# Patient Record
Sex: FEMALE | Race: WHITE | NOT HISPANIC OR LATINO | Employment: OTHER | URBAN - METROPOLITAN AREA
[De-identification: names, ages, dates, MRNs, and addresses within clinical notes are randomized per-mention and may not be internally consistent; named-entity substitution may affect disease eponyms.]

---

## 2017-11-09 ENCOUNTER — APPOINTMENT (OUTPATIENT)
Dept: WOMENS IMAGING | Facility: HOSPITAL | Age: 42
End: 2017-11-09

## 2017-11-09 PROCEDURE — 77063 BREAST TOMOSYNTHESIS BI: CPT | Performed by: RADIOLOGY

## 2017-11-09 PROCEDURE — 77067 SCR MAMMO BI INCL CAD: CPT | Performed by: RADIOLOGY

## 2018-11-16 ENCOUNTER — APPOINTMENT (OUTPATIENT)
Dept: WOMENS IMAGING | Facility: HOSPITAL | Age: 43
End: 2018-11-16

## 2018-11-16 PROCEDURE — 77063 BREAST TOMOSYNTHESIS BI: CPT | Performed by: RADIOLOGY

## 2018-11-16 PROCEDURE — 77067 SCR MAMMO BI INCL CAD: CPT | Performed by: RADIOLOGY

## 2019-12-10 ENCOUNTER — APPOINTMENT (OUTPATIENT)
Dept: WOMENS IMAGING | Facility: HOSPITAL | Age: 44
End: 2019-12-10

## 2019-12-10 PROCEDURE — 77067 SCR MAMMO BI INCL CAD: CPT | Performed by: RADIOLOGY

## 2019-12-10 PROCEDURE — 77063 BREAST TOMOSYNTHESIS BI: CPT | Performed by: RADIOLOGY

## 2020-12-16 ENCOUNTER — APPOINTMENT (OUTPATIENT)
Dept: WOMENS IMAGING | Facility: HOSPITAL | Age: 45
End: 2020-12-16

## 2020-12-16 PROCEDURE — 77063 BREAST TOMOSYNTHESIS BI: CPT | Performed by: RADIOLOGY

## 2020-12-16 PROCEDURE — 77067 SCR MAMMO BI INCL CAD: CPT | Performed by: RADIOLOGY

## 2021-01-04 ENCOUNTER — TELEPHONE (OUTPATIENT)
Dept: INTERNAL MEDICINE | Age: 46
End: 2021-01-04

## 2021-01-04 NOTE — TELEPHONE ENCOUNTER
LVM FOR PATIENT TO CALL.  PER DR PETERS SHE CAN BE ADDED AS A NEW PT IN THE NEXT 2 AVAILABLE SLOTS.

## 2021-01-14 ENCOUNTER — OFFICE VISIT (OUTPATIENT)
Dept: INTERNAL MEDICINE | Age: 46
End: 2021-01-14

## 2021-01-14 ENCOUNTER — LAB (OUTPATIENT)
Dept: LAB | Facility: HOSPITAL | Age: 46
End: 2021-01-14

## 2021-01-14 VITALS
WEIGHT: 135 LBS | DIASTOLIC BLOOD PRESSURE: 54 MMHG | TEMPERATURE: 97.8 F | HEART RATE: 60 BPM | BODY MASS INDEX: 19.33 KG/M2 | HEIGHT: 70 IN | OXYGEN SATURATION: 97 % | SYSTOLIC BLOOD PRESSURE: 104 MMHG

## 2021-01-14 DIAGNOSIS — R06.02 SHORTNESS OF BREATH: ICD-10-CM

## 2021-01-14 DIAGNOSIS — F41.9 ANXIETY: ICD-10-CM

## 2021-01-14 DIAGNOSIS — R07.9 CHEST PAIN, UNSPECIFIED TYPE: ICD-10-CM

## 2021-01-14 DIAGNOSIS — F51.02 ADJUSTMENT INSOMNIA: ICD-10-CM

## 2021-01-14 DIAGNOSIS — U07.1 COVID-19 VIRUS INFECTION: Primary | ICD-10-CM

## 2021-01-14 LAB
ALBUMIN SERPL-MCNC: 4.1 G/DL (ref 3.5–5.2)
ALBUMIN/GLOB SERPL: 1.6 G/DL
ALP SERPL-CCNC: 17 U/L (ref 39–117)
ALT SERPL W P-5'-P-CCNC: 15 U/L (ref 1–33)
ANION GAP SERPL CALCULATED.3IONS-SCNC: 10.2 MMOL/L (ref 5–15)
AST SERPL-CCNC: 14 U/L (ref 1–32)
BASOPHILS # BLD AUTO: 0.03 10*3/MM3 (ref 0–0.2)
BASOPHILS NFR BLD AUTO: 0.3 % (ref 0–1.5)
BILIRUB SERPL-MCNC: 0.3 MG/DL (ref 0–1.2)
BUN SERPL-MCNC: 14 MG/DL (ref 6–20)
BUN/CREAT SERPL: 18.7 (ref 7–25)
CALCIUM SPEC-SCNC: 9.1 MG/DL (ref 8.6–10.5)
CHLORIDE SERPL-SCNC: 98 MMOL/L (ref 98–107)
CO2 SERPL-SCNC: 31.8 MMOL/L (ref 22–29)
CREAT SERPL-MCNC: 0.75 MG/DL (ref 0.57–1)
D DIMER PPP FEU-MCNC: 0.33 MCGFEU/ML (ref 0–0.49)
DEPRECATED RDW RBC AUTO: 38.2 FL (ref 37–54)
EOSINOPHIL # BLD AUTO: 0.06 10*3/MM3 (ref 0–0.4)
EOSINOPHIL NFR BLD AUTO: 0.7 % (ref 0.3–6.2)
ERYTHROCYTE [DISTWIDTH] IN BLOOD BY AUTOMATED COUNT: 11.1 % (ref 12.3–15.4)
GFR SERPL CREATININE-BSD FRML MDRD: 84 ML/MIN/1.73
GLOBULIN UR ELPH-MCNC: 2.6 GM/DL
GLUCOSE SERPL-MCNC: 97 MG/DL (ref 65–99)
HCT VFR BLD AUTO: 38.9 % (ref 34–46.6)
HGB BLD-MCNC: 13.2 G/DL (ref 12–15.9)
IMM GRANULOCYTES # BLD AUTO: 0.02 10*3/MM3 (ref 0–0.05)
IMM GRANULOCYTES NFR BLD AUTO: 0.2 % (ref 0–0.5)
LYMPHOCYTES # BLD AUTO: 4.33 10*3/MM3 (ref 0.7–3.1)
LYMPHOCYTES NFR BLD AUTO: 48.1 % (ref 19.6–45.3)
MCH RBC QN AUTO: 32.2 PG (ref 26.6–33)
MCHC RBC AUTO-ENTMCNC: 33.9 G/DL (ref 31.5–35.7)
MCV RBC AUTO: 94.9 FL (ref 79–97)
MONOCYTES # BLD AUTO: 0.92 10*3/MM3 (ref 0.1–0.9)
MONOCYTES NFR BLD AUTO: 10.2 % (ref 5–12)
NEUTROPHILS NFR BLD AUTO: 3.64 10*3/MM3 (ref 1.7–7)
NEUTROPHILS NFR BLD AUTO: 40.5 % (ref 42.7–76)
NRBC BLD AUTO-RTO: 0 /100 WBC (ref 0–0.2)
PLATELET # BLD AUTO: 299 10*3/MM3 (ref 140–450)
PMV BLD AUTO: 10.6 FL (ref 6–12)
POTASSIUM SERPL-SCNC: 3.2 MMOL/L (ref 3.5–5.2)
PROT SERPL-MCNC: 6.7 G/DL (ref 6–8.5)
RBC # BLD AUTO: 4.1 10*6/MM3 (ref 3.77–5.28)
SODIUM SERPL-SCNC: 140 MMOL/L (ref 136–145)
T4 FREE SERPL-MCNC: 1.15 NG/DL (ref 0.93–1.7)
TSH SERPL DL<=0.05 MIU/L-ACNC: 1.48 UIU/ML (ref 0.27–4.2)
WBC # BLD AUTO: 9 10*3/MM3 (ref 3.4–10.8)

## 2021-01-14 PROCEDURE — 84439 ASSAY OF FREE THYROXINE: CPT | Performed by: INTERNAL MEDICINE

## 2021-01-14 PROCEDURE — 80053 COMPREHEN METABOLIC PANEL: CPT | Performed by: INTERNAL MEDICINE

## 2021-01-14 PROCEDURE — 36415 COLL VENOUS BLD VENIPUNCTURE: CPT | Performed by: INTERNAL MEDICINE

## 2021-01-14 PROCEDURE — 85379 FIBRIN DEGRADATION QUANT: CPT | Performed by: INTERNAL MEDICINE

## 2021-01-14 PROCEDURE — 99205 OFFICE O/P NEW HI 60 MIN: CPT | Performed by: INTERNAL MEDICINE

## 2021-01-14 PROCEDURE — 85025 COMPLETE CBC W/AUTO DIFF WBC: CPT | Performed by: INTERNAL MEDICINE

## 2021-01-14 PROCEDURE — 84443 ASSAY THYROID STIM HORMONE: CPT | Performed by: INTERNAL MEDICINE

## 2021-01-14 RX ORDER — ALBUTEROL SULFATE 90 UG/1
2 AEROSOL, METERED RESPIRATORY (INHALATION) EVERY 6 HOURS PRN
COMMUNITY
Start: 2020-12-26 | End: 2021-01-26 | Stop reason: SDUPTHER

## 2021-01-14 RX ORDER — CALCIUM CARBONATE 200(500)MG
1 TABLET,CHEWABLE ORAL DAILY
Status: ON HOLD | COMMUNITY
End: 2021-10-29

## 2021-01-14 RX ORDER — DIPHENOXYLATE HYDROCHLORIDE AND ATROPINE SULFATE 2.5; .025 MG/1; MG/1
TABLET ORAL
Status: ON HOLD | COMMUNITY
End: 2021-10-29

## 2021-01-14 RX ORDER — TRAZODONE HYDROCHLORIDE 50 MG/1
50 TABLET ORAL NIGHTLY PRN
Qty: 30 TABLET | Refills: 0 | Status: SHIPPED | OUTPATIENT
Start: 2021-01-14 | End: 2021-02-10

## 2021-01-14 RX ORDER — MONTELUKAST SODIUM 10 MG/1
10 TABLET ORAL
COMMUNITY
Start: 2021-01-05

## 2021-01-14 RX ORDER — PANTOPRAZOLE SODIUM 40 MG/1
40 TABLET, DELAYED RELEASE ORAL DAILY
Qty: 30 TABLET | Refills: 0 | Status: SHIPPED | OUTPATIENT
Start: 2021-01-14 | End: 2021-01-15

## 2021-01-14 RX ORDER — NORGESTIMATE AND ETHINYL ESTRADIOL 7DAYSX3 28
1 KIT ORAL DAILY
COMMUNITY
Start: 2020-12-05

## 2021-01-14 NOTE — PROGRESS NOTES
Call patient with her test result(s) and mail the results to her if MyChart is NOT active.    Potassium level is low: start KCL 20 meq once daily x 3 days then discontinue #3, no refills.   Thyroid is normal.   Complete blood count is stable.   Await D-dimer result.

## 2021-01-14 NOTE — PROGRESS NOTES
Call patient with her test result(s) and mail the results to her if MyChart is NOT active.  Refer to separate lab result note regarding low potassium.     D-dimer is normal. DVT or pulmonary embolism unlikely.

## 2021-01-14 NOTE — PROGRESS NOTES
I N T E R N A L  M E D I C I N E  J U N O H  K I M,  M D      ENCOUNTER DATE:  01/14/2021    Sonali Lantigua / 45 y.o. / female      CHIEF COMPLAINT / REASON FOR OFFICE VISIT     Establish Care, Shortness of Breath, and Recent COVID infection      ASSESSMENT & PLAN     Problem List Items Addressed This Visit     None      Visit Diagnoses     COVID-19 virus infection    -  Primary    Relevant Orders    CBC & Differential    D-dimer, Quantitative    CBC Auto Differential    Shortness of breath        Chest pain, unspecified type        Relevant Medications    pantoprazole (PROTONIX) 40 MG EC tablet    Anxiety        Relevant Orders    TSH+Free T4    Comprehensive Metabolic Panel    Adjustment insomnia        Relevant Medications    traZODone (DESYREL) 50 MG tablet        Orders Placed This Encounter   Procedures   • TSH+Free T4   • Comprehensive Metabolic Panel   • D-dimer, Quantitative   • CBC Auto Differential   • CBC & Differential     New Medications Ordered This Visit   Medications   • traZODone (DESYREL) 50 MG tablet     Sig: Take 1 tablet by mouth At Night As Needed for Sleep.     Dispense:  30 tablet     Refill:  0   • pantoprazole (PROTONIX) 40 MG EC tablet     Sig: Take 1 tablet by mouth Daily.     Dispense:  30 tablet     Refill:  0       SUMMARY/DISCUSSION  • Post-COVID-19 symptoms of persitent shortness of breath, anxiety, insomnia.   • Check D-dimer, complete blood count, CMP, TSH and Free T4   • Trazodone 50 mg qHS PRN for sleep   • Pantoprazole 40 mg qd for possible GERD symptoms   • Advil or Tylenol PRN for pain  • Consider repeat chest xray and ordering echocardiogram if ongoing shortness of breath.   • Having some emotional difficulties related to COVID-19 infection, reassurance.   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      VITAL SIGNS     Visit Vitals  /54   Pulse 60   Temp 97.8 °F (36.6 °C)   Ht 177.8 cm (70\")   Wt 61.2 kg (135 lb)   LMP 12/29/2020 (Approximate)   SpO2 97%   BMI 19.37 kg/m²       BP Readings from Last 3 Encounters:   01/14/21 104/54     Wt Readings from Last 3 Encounters:   01/14/21 61.2 kg (135 lb)     Body mass index is 19.37 kg/m².        HISTORY OF PRESENT ILLNESS     Pleasant 46 yo female here to establish care. Diagnosed with COVID-19 infection around 12/20/20. Went to [facility] and chest xray was neg for pneumonia. Since then she complains of persistent low grade dyspnea and vague anterior chest discomfort. She also complains of some right calf discomfort with swelling/redness. She is on OCP, denies history of blood clots. She complains of ongoing pain around her lower rib areas bilaterally. She saw an allergist who prescribed Z-Gama, prednisone, and Breo/albuterol for shortness of breath. She complains of increased anxiety and insomnia (no prior history). Denies depression history. She works as a  on a private jet. She has taken time off work due to COVID-19 infection.         REVIEW OF SYSTEMS     Denies fever or chills  Denies anosmia and ageusia   Denies cough, has chest pain with deep inspiration  Denies angina, palpitations or WEST   Denies edema of legs  GI neg   neg  Neuro neg  Psych neg for suicidal ideation   Skin neg       PHYSICAL EXAMINATION     Physical Exam  Constitutional:       Comments: Thin female in no acute distress    HENT:      Right Ear: Tympanic membrane normal.      Left Ear: Tympanic membrane normal.   Eyes:      Pupils: Pupils are equal, round, and reactive to light.   Cardiovascular:      Rate and Rhythm: Normal rate and regular rhythm.      Pulses: Normal pulses.      Heart sounds: No murmur. No friction rub.   Pulmonary:      Effort: Pulmonary effort is normal. No respiratory distress.      Breath sounds: Normal breath sounds. No " stridor. No wheezing, rhonchi or rales.   Abdominal:      General: Abdomen is flat. There is no distension.      Palpations: Abdomen is soft.      Tenderness: There is no abdominal tenderness.      Comments: Mild tenderness over the lower ribs bilaterally    Musculoskeletal:      Right lower leg: No edema.      Left lower leg: No edema.      Comments: No calf swelling, redness or tenderness to palpation    Lymphadenopathy:      Cervical: No cervical adenopathy.   Skin:     General: Skin is warm.      Coloration: Skin is not jaundiced.   Neurological:      General: No focal deficit present.      Mental Status: She is oriented to person, place, and time.   Psychiatric:         Attention and Perception: Attention normal.         Mood and Affect: Mood is anxious. Depressed: somewhat emotional. Affect is tearful (mild).         Speech: Speech normal.         Behavior: Behavior normal. Behavior is cooperative.         Thought Content: Thought content normal.         Cognition and Memory: Cognition and memory normal.         Judgment: Judgment normal.             REVIEWED DATA     Labs:           Imaging:     EXAMINATION(S): XR CHEST 2VW    DATE: 12/22/2020    HISTORY: SOA, covid+.     COMPARISON: Chest x-ray June 9, 2018    FINDING(S): 2 views of chest submitted. Cardiac size upper limits of normal. Mediastinal contour is normal.    No evidence of pneumothorax or pleural effusion. The lungs are hyperexpanded.    There is dextroscoliosis of the thoracic spine with straightening of the neutral thoracic alignment.    IMPRESSION:     Hyperexpanded lungs. No acute consolidation.      Medical Tests:     DATE OF EXAM: 07/12/2018  11:31  EXAMINATION(S): ECHO DOPPLER 2D MMODE SPECT COLOR COMPLETE     Trace-to-mild tricuspid regurgitation.   Trace mitral regurgitation is present.   The ejection fraction biplane was calculated at 61%.   Global left ventricular wall motion and contractility are within normal   limits.   Normal  diastolic filling pattern.        Independent assessment of PFT results from allergist showing FEV1 68%, FEV1/FVC > 0.8 (no evidence of COPD), possible asthma     Summary of old records / correspondence / consultant report:           Request outside records:           MEDICATIONS AT THE TIME OF OFFICE VISIT     Current Outpatient Medications   Medication Sig Dispense Refill   • albuterol sulfate  (90 Base) MCG/ACT inhaler Inhale 2 puffs Every 6 (Six) Hours As Needed.     • calcium carbonate (TUMS) 500 MG chewable tablet Chew 1 tablet Daily.     • Fluticasone Furoate-Vilanterol (Breo Ellipta) 100-25 MCG/INH inhaler Inhale 1 puff Daily.     • montelukast (SINGULAIR) 10 MG tablet Take 10 mg by mouth every night at bedtime.     • multivitamin (THERAGRAN) tablet tablet Take  by mouth.     • Probiotic Product (PROBIOTIC DAILY PO) Take  by mouth Daily.     • Tri-Sprintec 0.18/0.215/0.25 MG-35 MCG per tablet Take 1 tablet by mouth Daily.     • Zinc Sulfate (ZINC 15 PO) Take  by mouth. Take 50 mg daily             *Examiner was wearing KN95 mask, face shield and exam gloves during the entire duration of the visit. Patient was masked the entire time.   Minimum social distance of 6 ft maintained entire visit except if physical contact was necessary as documented.     **Dragon Disclaimer:   Much of this encounter note is an electronic transcription/translation of spoken language to printed text. The electronic translation of spoken language may permit erroneous, or at times, nonsensical words or phrases to be inadvertently transcribed. Although I have reviewed the note for such errors, some may still exist.     Template created by Josue Hahn MD

## 2021-01-15 DIAGNOSIS — E87.6 LOW BLOOD POTASSIUM: Primary | ICD-10-CM

## 2021-01-15 RX ORDER — POTASSIUM CHLORIDE 1.5 G/1.77G
20 POWDER, FOR SOLUTION ORAL DAILY
Qty: 3 EACH | Refills: 0 | Status: SHIPPED | OUTPATIENT
Start: 2021-01-15 | End: 2021-01-18

## 2021-01-15 RX ORDER — PANTOPRAZOLE SODIUM 40 MG/1
40 TABLET, DELAYED RELEASE ORAL DAILY
Qty: 30 TABLET | Refills: 2 | Status: ON HOLD | OUTPATIENT
Start: 2021-01-15 | End: 2021-10-29

## 2021-01-16 DIAGNOSIS — E87.6 LOW BLOOD POTASSIUM: ICD-10-CM

## 2021-01-18 RX ORDER — POTASSIUM CHLORIDE 1.5 G/1
POWDER, FOR SOLUTION ORAL
Qty: 3 EACH | Refills: 0 | OUTPATIENT
Start: 2021-01-18

## 2021-01-21 ENCOUNTER — TELEPHONE (OUTPATIENT)
Dept: INTERNAL MEDICINE | Age: 46
End: 2021-01-21

## 2021-01-21 NOTE — TELEPHONE ENCOUNTER
Pt had scheduled ethan 1/29/21 for pressure on throat and sob    Called pt back to find out more information     Pt saw dr mcclellan 1/14/21 to establish care and history of covid , SOB.     She still has the breathing problem, but not bad(it happen while breathing in ,but it ease later)   pressure in throat specially around afternoon.     Negative for:   tightness, chest pain or tingling, dizziness .    Offered to be worked in with APRN since dr mcclellan out for thursday and Friday. Stated she is good     Pt has ethan to see cardiology Monday    I moved her ethan from the 1/29  To 1/26.    Informed if symptoms worse until ethan date need to be evaluated in ER

## 2021-01-26 ENCOUNTER — OFFICE VISIT (OUTPATIENT)
Dept: INTERNAL MEDICINE | Age: 46
End: 2021-01-26

## 2021-01-26 ENCOUNTER — HOSPITAL ENCOUNTER (OUTPATIENT)
Dept: GENERAL RADIOLOGY | Facility: HOSPITAL | Age: 46
Discharge: HOME | End: 2021-01-26
Admitting: INTERNAL MEDICINE

## 2021-01-26 VITALS
BODY MASS INDEX: 19.47 KG/M2 | HEIGHT: 70 IN | WEIGHT: 136 LBS | TEMPERATURE: 97.1 F | OXYGEN SATURATION: 100 % | HEART RATE: 69 BPM | SYSTOLIC BLOOD PRESSURE: 100 MMHG | DIASTOLIC BLOOD PRESSURE: 60 MMHG

## 2021-01-26 DIAGNOSIS — R06.02 SHORTNESS OF BREATH: ICD-10-CM

## 2021-01-26 DIAGNOSIS — R06.02 SHORTNESS OF BREATH: Primary | ICD-10-CM

## 2021-01-26 DIAGNOSIS — F51.02 ADJUSTMENT INSOMNIA: ICD-10-CM

## 2021-01-26 DIAGNOSIS — U07.1 COVID-19 VIRUS INFECTION: ICD-10-CM

## 2021-01-26 DIAGNOSIS — R07.81 RIB PAIN ON LEFT SIDE: ICD-10-CM

## 2021-01-26 PROCEDURE — 99214 OFFICE O/P EST MOD 30 MIN: CPT | Performed by: INTERNAL MEDICINE

## 2021-01-26 PROCEDURE — 71046 X-RAY EXAM CHEST 2 VIEWS: CPT

## 2021-01-26 RX ORDER — MULTIVIT WITH MINERALS/LUTEIN
TABLET ORAL
COMMUNITY
End: 2022-08-11

## 2021-01-26 RX ORDER — CHOLECALCIFEROL (VITAMIN D3) 125 MCG
CAPSULE ORAL
COMMUNITY

## 2021-01-26 RX ORDER — ALBUTEROL SULFATE 90 UG/1
2 AEROSOL, METERED RESPIRATORY (INHALATION) EVERY 6 HOURS PRN
Qty: 8 G | Refills: 1 | Status: SHIPPED | OUTPATIENT
Start: 2021-01-26 | End: 2021-03-31

## 2021-01-26 NOTE — PROGRESS NOTES
"    I N T E R N A L  M E D I C I N E  J U N O H  K I M,  M D      ENCOUNTER DATE:  01/26/2021    Sonali Lantigua / 45 y.o. / female      CHIEF COMPLAINT / REASON FOR OFFICE VISIT     Shortness of Breath and Recent covid infection      ASSESSMENT & PLAN     1. Shortness of breath    2. COVID-19 virus infection    3. Rib pain on left side    4. Adjustment insomnia      Orders Placed This Encounter   Procedures   • XR Chest 2 View   • Pulmonary Function Test     No orders of the defined types were placed in this encounter.      SUMMARY/DISCUSSION  • Check chest xray, PFT   • She is scheduled for echocardiogram with cardiologist at [facility] in February.   • D-dimer was normal previously and likely rules out VTE event.   • Reassurance provided that the left chest wall pain is likely musculoskeletal in origin and should improve  • Follow-up 1 month       Next Appointment with me: 2/26/2021    No follow-ups on file.      VITAL SIGNS     Visit Vitals  /60   Pulse 69   Temp 97.1 °F (36.2 °C)   Ht 177.8 cm (70\")   Wt 61.7 kg (136 lb)   LMP 12/29/2020 (Approximate)   SpO2 100%   BMI 19.51 kg/m²       BP Readings from Last 3 Encounters:   01/26/21 100/60   01/14/21 104/54     Wt Readings from Last 3 Encounters:   01/26/21 61.7 kg (136 lb)   01/14/21 61.2 kg (135 lb)     Body mass index is 19.51 kg/m².        HISTORY OF PRESENT ILLNESS     Saw a cardiologist at [facility] for dyspnea and chest pain. Scheduled echocardiogram for February. EKG was negative for significant findings. She is recovering from recent COVID-19 infection. Denies fever, cough. Sleeping better with trazodone.         REVIEW OF SYSTEMS     No fever or wt loss  No palpitations, PND/orthopnea/edema  GI neg   Psych anxiety; sleep is better with trazodone.       PHYSICAL EXAMINATION     Physical Exam  No acute distress, no apparent dyspnea  Chest normal heart rate/sound; normal breath sounds, no tachypnea  Chest wall : left side rib mild tenderness to palpation " "      REVIEWED DATA     Labs:     D-dimer 0.33 (1/14/21)    Lab Results   Component Value Date     01/14/2021    K 3.2 (L) 01/14/2021    AST 14 01/14/2021    ALT 15 01/14/2021    BUN 14 01/14/2021    CREATININE 0.75 01/14/2021    CREATININE 0.8 06/09/2018    CREATININE 0.8 06/06/2018    EGFRIFNONA 84 01/14/2021       No results found for: HGBA1C    Lab Results   Component Value Date     (H) 06/06/2018    HDL 68 06/06/2018    TRIG 106 06/06/2018       Lab Results   Component Value Date    TSH 1.480 01/14/2021    FREET4 1.15 01/14/2021       Lab Results   Component Value Date    WBC 9.00 01/14/2021    HGB 13.2 01/14/2021     01/14/2021         Imaging:     EXAMINATION(S): XR CHEST 2VW    DATE: 12/22/2020    HISTORY: SOA, COVID-19+.     COMPARISON: Chest x-ray June 9, 2018    FINDING(S): 2 views of chest submitted. Cardiac size upper limits of normal. Mediastinal contour is normal.    No evidence of pneumothorax or pleural effusion. The lungs are hyperexpanded.    There is dextroscoliosis of the thoracic spine with straightening of the neutral thoracic alignment.    IMPRESSION:     Hyperexpanded lungs. No acute consolidation.        Medical Tests:     EKG at [facility] reviewed by cardiologist:   \"normal sinus rhythm rate 72, decreased septal forces, nonspecific ST-T wave changes\"      Summary of old records / correspondence / consultant report:     [facility] cardiologist office note: plan echocardiogram to assess dyspnea       Request outside records:           MEDICATIONS AT THE TIME OF OFFICE VISIT     Current Outpatient Medications   Medication Sig Dispense Refill   • albuterol sulfate  (90 Base) MCG/ACT inhaler Inhale 2 puffs Every 6 (Six) Hours As Needed.     • calcium carbonate (TUMS) 500 MG chewable tablet Chew 1 tablet Daily.     • Fluticasone Furoate-Vilanterol (Breo Ellipta) 100-25 MCG/INH inhaler Inhale 1 puff Daily.     • montelukast (SINGULAIR) 10 MG tablet Take 10 mg by mouth " every night at bedtime.     • pantoprazole (PROTONIX) 40 MG EC tablet TAKE 1 TABLET BY MOUTH DAILY 30 tablet 2   • Probiotic Product (PROBIOTIC DAILY PO) Take  by mouth Daily.     • traZODone (DESYREL) 50 MG tablet Take 1 tablet by mouth At Night As Needed for Sleep. 30 tablet 0   • Tri-Sprintec 0.18/0.215/0.25 MG-35 MCG per tablet Take 1 tablet by mouth Daily.     • Zinc Sulfate (ZINC 15 PO) Take  by mouth. Take 50 mg daily     • ascorbic acid (VITAMIN C) 1000 MG tablet      • Cholecalciferol (Vitamin D3) 50 MCG (2000 UT) tablet      • multivitamin (THERAGRAN) tablet tablet Take  by mouth.       No current facility-administered medications for this visit.            *Examiner was wearing KN95 mask, face shield and exam gloves during the entire duration of the visit. Patient was masked the entire time.   Minimum social distance of 6 ft maintained entire visit except if physical contact was necessary as documented.     **Dragon Disclaimer:   Much of this encounter note is an electronic transcription/translation of spoken language to printed text. The electronic translation of spoken language may permit erroneous, or at times, nonsensical words or phrases to be inadvertently transcribed. Although I have reviewed the note for such errors, some may still exist.     Template created by Josue Hahn MD

## 2021-01-26 NOTE — ASSESSMENT & PLAN NOTE
Due to ongoing dyspnea complaint, recheck chest xray today. She is scheduled for echocardiogram with cardiologist at [facility] in February. D-dimer previously was normal and has normal O2 sats. Low likelihood for VTE. However, consider CT if worsening dyspnea. Discussed with patient.

## 2021-01-26 NOTE — ASSESSMENT & PLAN NOTE
Check chest xray and PFT. To have echocardiogram with cardiologist in February at [facility].

## 2021-01-26 NOTE — PROGRESS NOTES
Shannen:    Sonali, here are the result(s) of your test(s):     No active pulmonary disease noted.   Incidental note of scoliosis of thoracic spine.     Please do not hesitate to contact me if you have questions.

## 2021-01-27 RX ORDER — ALBUTEROL SULFATE 90 UG/1
AEROSOL, METERED RESPIRATORY (INHALATION)
Qty: 25.5 G | OUTPATIENT
Start: 2021-01-27

## 2021-01-28 ENCOUNTER — TRANSCRIBE ORDERS (OUTPATIENT)
Dept: LAB | Facility: HOSPITAL | Age: 46
End: 2021-01-28

## 2021-01-28 DIAGNOSIS — Z01.818 OTHER SPECIFIED PRE-OPERATIVE EXAMINATION: Primary | ICD-10-CM

## 2021-02-10 DIAGNOSIS — F51.02 ADJUSTMENT INSOMNIA: ICD-10-CM

## 2021-02-10 RX ORDER — TRAZODONE HYDROCHLORIDE 50 MG/1
50 TABLET ORAL NIGHTLY PRN
Qty: 30 TABLET | Refills: 1 | Status: SHIPPED | OUTPATIENT
Start: 2021-02-10 | End: 2021-04-02 | Stop reason: SDUPTHER

## 2021-03-12 ENCOUNTER — LAB (OUTPATIENT)
Dept: LAB | Facility: HOSPITAL | Age: 46
End: 2021-03-12

## 2021-03-12 DIAGNOSIS — Z01.818 OTHER SPECIFIED PRE-OPERATIVE EXAMINATION: ICD-10-CM

## 2021-03-12 PROCEDURE — C9803 HOPD COVID-19 SPEC COLLECT: HCPCS

## 2021-03-12 PROCEDURE — U0004 COV-19 TEST NON-CDC HGH THRU: HCPCS

## 2021-03-13 LAB — SARS-COV-2 ORF1AB RESP QL NAA+PROBE: NOT DETECTED

## 2021-03-15 ENCOUNTER — HOSPITAL ENCOUNTER (OUTPATIENT)
Dept: RESPIRATORY THERAPY | Facility: HOSPITAL | Age: 46
Discharge: HOME | End: 2021-03-15
Admitting: INTERNAL MEDICINE

## 2021-03-15 LAB
BDY SITE: NORMAL
HGB BLDA-MCNC: 12.7 G/DL (ref 12–18)

## 2021-03-15 PROCEDURE — 82820 HEMOGLOBIN-OXYGEN AFFINITY: CPT | Performed by: INTERNAL MEDICINE

## 2021-03-15 PROCEDURE — 94010 BREATHING CAPACITY TEST: CPT

## 2021-03-15 PROCEDURE — 94729 DIFFUSING CAPACITY: CPT

## 2021-03-15 PROCEDURE — 94726 PLETHYSMOGRAPHY LUNG VOLUMES: CPT

## 2021-03-29 ENCOUNTER — OFFICE VISIT (OUTPATIENT)
Dept: INTERNAL MEDICINE | Age: 46
End: 2021-03-29

## 2021-03-29 VITALS
HEIGHT: 70 IN | BODY MASS INDEX: 19.47 KG/M2 | DIASTOLIC BLOOD PRESSURE: 60 MMHG | HEART RATE: 86 BPM | TEMPERATURE: 96.9 F | WEIGHT: 136 LBS | SYSTOLIC BLOOD PRESSURE: 90 MMHG | OXYGEN SATURATION: 98 %

## 2021-03-29 DIAGNOSIS — U09.9 POST-COVID SYNDROME: ICD-10-CM

## 2021-03-29 DIAGNOSIS — R94.2 DIFFUSION CAPACITY OF LUNG (DL), DECREASED: ICD-10-CM

## 2021-03-29 DIAGNOSIS — R06.02 SHORTNESS OF BREATH: Primary | ICD-10-CM

## 2021-03-29 DIAGNOSIS — R94.2 ABNORMAL PFT: ICD-10-CM

## 2021-03-29 PROBLEM — R07.81 RIB PAIN ON LEFT SIDE: Status: RESOLVED | Noted: 2021-01-26 | Resolved: 2021-03-29

## 2021-03-29 PROCEDURE — 99214 OFFICE O/P EST MOD 30 MIN: CPT | Performed by: INTERNAL MEDICINE

## 2021-03-29 RX ORDER — TRIAMCINOLONE ACETONIDE 0.25 MG/G
CREAM TOPICAL 2 TIMES DAILY
Status: ON HOLD | COMMUNITY
Start: 2021-03-12 | End: 2021-10-29

## 2021-03-29 NOTE — PROGRESS NOTES
"    I N T E R N A L  M E D I C I N E  J U N O H  K I M,  M D      ENCOUNTER DATE:  03/29/2021    Sonali Lantigua / 45 y.o. / female      CHIEF COMPLAINT / REASON FOR OFFICE VISIT     Shortness of Breath      ASSESSMENT & PLAN     1. Shortness of breath    2. Diffusion capacity of lung (dl), decreased    3. Abnormal PFT    4. Post-COVID syndrome       Orders Placed This Encounter   Procedures   • CT Chest Hi Resolution Diagnostic     No orders of the defined types were placed in this encounter.      SUMMARY/DISCUSSION  • Ongoing dyspnea since COVID-19 infection.   • Echocardiogram was normal.   • PFT showed marginally abnormal restrictive/obstructive pattern with low DLCO.   • Check HRCT for further evaluation.   • Continue inhalers and montelukast.   • Follow-up 3 months.       Next Appointment with me: Visit date not found    Return in about 3 months (around 6/29/2021) for Reassess today's problem(s).      VITAL SIGNS     Visit Vitals  BP 90/60 (BP Location: Left arm)   Pulse 86   Temp 96.9 °F (36.1 °C)   Ht 177.8 cm (70\")   Wt 61.7 kg (136 lb)   LMP 03/14/2021 (Exact Date)   SpO2 98%   BMI 19.51 kg/m²       BP Readings from Last 3 Encounters:   03/29/21 90/60   01/26/21 100/60   01/14/21 104/54     Wt Readings from Last 3 Encounters:   03/29/21 61.7 kg (136 lb)   01/26/21 61.7 kg (136 lb)   01/14/21 61.2 kg (135 lb)     Body mass index is 19.51 kg/m².      MEDICATIONS AT THE TIME OF OFFICE VISIT     Current Outpatient Medications on File Prior to Visit   Medication Sig   • albuterol sulfate  (90 Base) MCG/ACT inhaler Inhale 2 puffs Every 6 (Six) Hours As Needed for Shortness of Air.   • ascorbic acid (VITAMIN C) 1000 MG tablet    • calcium carbonate (TUMS) 500 MG chewable tablet Chew 1 tablet Daily.   • Cholecalciferol (Vitamin D3) 50 MCG (2000 UT) tablet    • montelukast (SINGULAIR) 10 MG tablet Take 10 mg by mouth every night at bedtime.   • pantoprazole (PROTONIX) 40 MG EC tablet TAKE 1 TABLET BY MOUTH " DAILY   • Probiotic Product (PROBIOTIC DAILY PO) Take  by mouth Daily.   • traZODone (DESYREL) 50 MG tablet TAKE 1 TABLET BY MOUTH AT NIGHT AS NEEDED FOR SLEEP   • Tri-Sprintec 0.18/0.215/0.25 MG-35 MCG per tablet Take 1 tablet by mouth Daily.   • triamcinolone (KENALOG) 0.025 % cream 2 (Two) Times a Day. to affected area PRN   • Wixela Inhub 100-50 MCG/DOSE DISKUS INHALE 1 PUFF BY MOUTH EVERY 12 HOURS. RINSE MOUTH AFTER USE   • Zinc Sulfate (ZINC 15 PO) Take  by mouth. Take 50 mg daily   • multivitamin (THERAGRAN) tablet tablet Take  by mouth.       HISTORY OF PRESENT ILLNESS     Still has dyspnea with physical exertion. Echocardiogram showed normal systolic/diastolic function. No significant valvular disease.   PFT showed mild restrictive/obstructive patttern with low DLCO. Hemoglobin is normal. Using Wixela, montleukast and albuterol PRN.   Is back to working full time. Denies cough or chest pain/palp.       REVIEW OF SYSTEMS     No fever or night sweat   Persistent dyspnea with exertion without chronic cough  No sinus problem or hemoptysis  No cyanosis of digits    neg   GI neg   Vascular negative for for Raynaud's  No unusual rash      PHYSICAL EXAMINATION     Physical Exam  No acute distress   Pulm/Chest: Effort normal, breath sounds normal.  Cardiovascular: Normal rate, regular rhythm. No lower extremity edema. No clubbing.   Psych: Normal mood and affect. Alert and intact judgment.       REVIEWED DATA     Labs:     Lab Results   Component Value Date     01/14/2021    K 3.2 (L) 01/14/2021    CALCIUM 9.1 01/14/2021    AST 14 01/14/2021    ALT 15 01/14/2021    BUN 14 01/14/2021    CREATININE 0.75 01/14/2021    CREATININE 0.8 06/09/2018    CREATININE 0.8 06/06/2018    EGFRIFNONA 84 01/14/2021       No results found for: HGBA1C    Lab Results   Component Value Date     (H) 06/06/2018    HDL 68 06/06/2018    TRIG 106 06/06/2018       Lab Results   Component Value Date    TSH 1.480 01/14/2021     FREET4 1.15 01/14/2021       Lab Results   Component Value Date    WBC 9.00 01/14/2021    HGB 13.2 01/14/2021     01/14/2021         Imaging:           Medical Tests:     2/12/21 ECHOCARDIOGRAM  Normal LV systolic function   Mild tricuspid regurgitation   Trace mitral regurgitation    Compared to a previous from July 2018--no significant change      3/15/21 PULMONARY FUNCTION TEST  Flow volume loops reviewed and demonstrate good patient effort the loops are relatively normal in appearance.  The FVC is reduced the FEV1 is reduced the FEV1 to FVC ratio is within normal limits but it is towards the lower sides of normal.  This pattern raises the possibility of a mixed restrictive and obstructive process.  Lung volume testing technically had total lung capacity and residual volume are within normal limits though they are towards the lower ends of the normal range.  The diffusion capacity for carbon side is mildly reduced and this persist when correcting for patient's hemoglobin.     Impression: These pulmonary function studies strictly speaking have a mild diffusion deficit that suggest a pulmonary vascular process although there are features as noted above where I could not exclude some mixed obstructive and restrictive process neither would be very severe I recommend clinical correlation.      Summary of old records / correspondence / consultant report:           Request outside records:             *Examiner was wearing KN95 mask, face shield and exam gloves during the entire duration of the visit. Patient was masked the entire time.   Minimum social distance of 6 ft maintained entire visit except if physical contact was necessary as documented.     **Dragon Disclaimer:   Much of this encounter note is an electronic transcription/translation of spoken language to printed text. The electronic translation of spoken language may permit erroneous, or at times, nonsensical words or phrases to be inadvertently  transcribed. Although I have reviewed the note for such errors, some may still exist.     Template created by Josue Hahn MD

## 2021-03-31 DIAGNOSIS — R06.02 SHORTNESS OF BREATH: ICD-10-CM

## 2021-03-31 RX ORDER — ALBUTEROL SULFATE 90 UG/1
AEROSOL, METERED RESPIRATORY (INHALATION)
Qty: 8.5 G | Refills: 2 | Status: SHIPPED | OUTPATIENT
Start: 2021-03-31 | End: 2021-09-01

## 2021-04-02 DIAGNOSIS — F51.02 ADJUSTMENT INSOMNIA: ICD-10-CM

## 2021-04-02 RX ORDER — TRAZODONE HYDROCHLORIDE 50 MG/1
50 TABLET ORAL NIGHTLY PRN
Qty: 30 TABLET | Refills: 1 | Status: SHIPPED | OUTPATIENT
Start: 2021-04-02 | End: 2021-05-24

## 2021-04-15 ENCOUNTER — HOSPITAL ENCOUNTER (OUTPATIENT)
Dept: CT IMAGING | Facility: HOSPITAL | Age: 46
Discharge: HOME | End: 2021-04-15
Admitting: INTERNAL MEDICINE

## 2021-04-15 PROCEDURE — 71250 CT THORAX DX C-: CPT

## 2021-04-19 ENCOUNTER — TELEPHONE (OUTPATIENT)
Dept: INTERNAL MEDICINE | Age: 46
End: 2021-04-19

## 2021-04-20 DIAGNOSIS — U09.9 POST-COVID SYNDROME: ICD-10-CM

## 2021-04-20 DIAGNOSIS — R07.0 THROAT DISCOMFORT: Primary | ICD-10-CM

## 2021-05-22 DIAGNOSIS — F51.02 ADJUSTMENT INSOMNIA: ICD-10-CM

## 2021-05-24 RX ORDER — TRAZODONE HYDROCHLORIDE 50 MG/1
50 TABLET ORAL NIGHTLY PRN
Qty: 30 TABLET | Refills: 2 | Status: SHIPPED | OUTPATIENT
Start: 2021-05-24 | End: 2021-08-02

## 2021-05-24 NOTE — TELEPHONE ENCOUNTER
LMTRC  Pt due this month for yearly visit and labs  Medication is being filled for 1 time refill only due to:  Patient needs to be seen because due for yearly OV this month and was given a 90 day supply in October.     Thais Silva RN, BSN     lov 3/29/21  Nov 7/7/21

## 2021-07-16 ENCOUNTER — OFFICE VISIT (OUTPATIENT)
Dept: INTERNAL MEDICINE | Age: 46
End: 2021-07-16

## 2021-07-16 VITALS
BODY MASS INDEX: 19.1 KG/M2 | TEMPERATURE: 97.8 F | DIASTOLIC BLOOD PRESSURE: 70 MMHG | SYSTOLIC BLOOD PRESSURE: 102 MMHG | HEIGHT: 70 IN | OXYGEN SATURATION: 100 % | HEART RATE: 70 BPM | WEIGHT: 133.4 LBS

## 2021-07-16 DIAGNOSIS — R11.0 NAUSEA: ICD-10-CM

## 2021-07-16 DIAGNOSIS — R42 VERTIGO: Primary | ICD-10-CM

## 2021-07-16 PROBLEM — R94.31 HOLTER MONITOR, ABNORMAL: Status: ACTIVE | Noted: 2018-07-02

## 2021-07-16 PROBLEM — R07.89 ATYPICAL CHEST PAIN: Status: ACTIVE | Noted: 2021-01-25

## 2021-07-16 PROBLEM — Z86.16 HISTORY OF COVID-19: Status: ACTIVE | Noted: 2021-01-25

## 2021-07-16 PROCEDURE — 99213 OFFICE O/P EST LOW 20 MIN: CPT | Performed by: NURSE PRACTITIONER

## 2021-07-16 RX ORDER — MECLIZINE HYDROCHLORIDE 25 MG/1
25 TABLET ORAL 3 TIMES DAILY PRN
Qty: 21 TABLET | Refills: 0 | Status: ON HOLD | OUTPATIENT
Start: 2021-07-16 | End: 2021-10-29

## 2021-07-16 RX ORDER — ONDANSETRON 4 MG/1
4 TABLET, FILM COATED ORAL EVERY 8 HOURS PRN
Qty: 21 TABLET | Refills: 0 | Status: SHIPPED | OUTPATIENT
Start: 2021-07-16 | End: 2021-10-27

## 2021-07-16 RX ORDER — METHYLPREDNISOLONE 4 MG/1
TABLET ORAL
Qty: 1 EACH | Refills: 0 | Status: SHIPPED | OUTPATIENT
Start: 2021-07-16 | End: 2021-08-02

## 2021-07-16 NOTE — PROGRESS NOTES
"    I N T E R N A L  M E D I C I N E  DAVY ZAMBRANO, APRN      ENCOUNTER DATE:  07/16/2021    Sonali Lantigua / 46 y.o. / female      CHIEF COMPLAINT / REASON FOR OFFICE VISIT     Headache (x3-4 off and on), Dizziness (x1 day, with vomiting), and Ear Problem (popping)      ASSESSMENT & PLAN     1. Vertigo  -Suspect likely vestibular neuritis post acute respiratory infection  -We will trial Medrol pack along with meclizine as needed for symptoms    2. Nausea  -Zofran as needed for nausea    No orders of the defined types were placed in this encounter.    New Medications Ordered This Visit   Medications   • methylPREDNISolone (MEDROL) 4 MG dose pack     Sig: Take as directed on package instructions.     Dispense:  1 each     Refill:  0   • meclizine (ANTIVERT) 25 MG tablet     Sig: Take 1 tablet by mouth 3 (Three) Times a Day As Needed for Dizziness.     Dispense:  21 tablet     Refill:  0   • ondansetron (Zofran) 4 MG tablet     Sig: Take 1 tablet by mouth Every 8 (Eight) Hours As Needed for Nausea or Vomiting.     Dispense:  21 tablet     Refill:  0       SUMMARY/DISCUSSION  • Follow-up in 1 week if symptoms do not improve or worsen      Next Appointment with me: Visit date not found    No follow-ups on file.      VITAL SIGNS     Visit Vitals  /70   Pulse 70   Temp 97.8 °F (36.6 °C) (Temporal)   Ht 177.8 cm (70\")   Wt 60.5 kg (133 lb 6.4 oz)   LMP 07/04/2021 (Exact Date)   SpO2 100%   Breastfeeding No   BMI 19.14 kg/m²     Wt Readings from Last 3 Encounters:   07/16/21 60.5 kg (133 lb 6.4 oz)   03/29/21 61.7 kg (136 lb)   01/26/21 61.7 kg (136 lb)     Body mass index is 19.14 kg/m².      MEDICATIONS AT THE TIME OF OFFICE VISIT     Current Outpatient Medications on File Prior to Visit   Medication Sig   • albuterol sulfate  (90 Base) MCG/ACT inhaler INHALE 2 PUFFS BY MOUTH EVERY 6 HOURS AS NEEDED FOR SHORTNESS OF AIR   • ascorbic acid (VITAMIN C) 1000 MG tablet    • Cholecalciferol (Vitamin D3) 50 MCG (2000 " UT) tablet    • montelukast (SINGULAIR) 10 MG tablet Take 10 mg by mouth every night at bedtime.   • pantoprazole (PROTONIX) 40 MG EC tablet TAKE 1 TABLET BY MOUTH DAILY   • Probiotic Product (PROBIOTIC DAILY PO) Take  by mouth Daily.   • traZODone (DESYREL) 50 MG tablet TAKE 1 TABLET BY MOUTH AT NIGHT AS NEEDED FOR SLEEP   • Tri-Sprintec 0.18/0.215/0.25 MG-35 MCG per tablet Take 1 tablet by mouth Daily.   • Wixela Inhub 100-50 MCG/DOSE DISKUS INHALE 1 PUFF BY MOUTH EVERY 12 HOURS. RINSE MOUTH AFTER USE   • Zinc Sulfate (ZINC 15 PO) Take  by mouth. Take 50 mg daily   • calcium carbonate (TUMS) 500 MG chewable tablet Chew 1 tablet Daily.   • multivitamin (THERAGRAN) tablet tablet Take  by mouth.   • triamcinolone (KENALOG) 0.025 % cream 2 (Two) Times a Day. to affected area PRN     No current facility-administered medications on file prior to visit.         HISTORY OF PRESENT ILLNESS     Patient presents with symptoms of 3 to 4 days of headache, vertigo and nausea.  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  She states headache is unlike her migraine which she has had many years prior.  Is not having any visual disturbances or neurological symptoms.  No weakness, numbness or tingling or confusion.  No syncopal episodes.  Headache is mid frontal which she experienced more heavily with nasal congestion.  Over the last few days she is experiencing nausea with occurrence of vomiting after vertigo episodes.  Denies any fever or chills.  Able to keep fluids down.      REVIEW OF SYSTEMS     Constitutional neg except per HPI   ENT neg visual disturbance   Resp neg  CV neg  GI nausea   Neuro vertigo, headache    PHYSICAL EXAMINATION     Physical Exam  Constitutional  No distress  ENT PERRLA   Cardiovascular Rate  normal . Rhythm: regular . Heart sounds:  normal  Pulmonary/Chest  Effort normal. Breath sounds:   normal  Musc neg weakness   Psychiatric  Alert. Judgment and thought content normal. Mood normal       REVIEWED DATA     Labs:         Imaging:           Medical Tests:             Summary of old records / correspondence / consultant report:           Request outside records:           *Examiner was wearing medical surgical mask, face shield and exam gloves during the entire duration of the visit. Patient was masked the entire time.   Minimum social distance of 6 ft maintained entire visit except if physical contact was necessary as documented.     **Dragon Disclaimer:   Much of this encounter note is an electronic transcription/translation of spoken language to printed text. The electronic translation of spoken language may permit erroneous, or at times, nonsensical words or phrases to be inadvertently transcribed. Although I have reviewed the note for such errors, some may still exist.

## 2021-08-02 ENCOUNTER — OFFICE VISIT (OUTPATIENT)
Dept: INTERNAL MEDICINE | Age: 46
End: 2021-08-02

## 2021-08-02 VITALS
BODY MASS INDEX: 19.3 KG/M2 | TEMPERATURE: 97.5 F | DIASTOLIC BLOOD PRESSURE: 68 MMHG | SYSTOLIC BLOOD PRESSURE: 110 MMHG | WEIGHT: 134.8 LBS | HEART RATE: 69 BPM | OXYGEN SATURATION: 99 % | HEIGHT: 70 IN

## 2021-08-02 DIAGNOSIS — J32.9 SINUSITIS, UNSPECIFIED CHRONICITY, UNSPECIFIED LOCATION: Primary | ICD-10-CM

## 2021-08-02 DIAGNOSIS — F51.02 ADJUSTMENT INSOMNIA: ICD-10-CM

## 2021-08-02 PROCEDURE — 99213 OFFICE O/P EST LOW 20 MIN: CPT | Performed by: NURSE PRACTITIONER

## 2021-08-02 RX ORDER — TRAZODONE HYDROCHLORIDE 50 MG/1
50 TABLET ORAL NIGHTLY PRN
Qty: 30 TABLET | Refills: 2 | Status: ON HOLD | OUTPATIENT
Start: 2021-08-02 | End: 2021-10-28

## 2021-08-02 RX ORDER — AMOXICILLIN AND CLAVULANATE POTASSIUM 875; 125 MG/1; MG/1
1 TABLET, FILM COATED ORAL 2 TIMES DAILY
Qty: 14 TABLET | Refills: 0 | Status: SHIPPED | OUTPATIENT
Start: 2021-08-02 | End: 2021-08-10

## 2021-08-02 NOTE — PROGRESS NOTES
"    I N T E R N A L  M E D I C I N E  DAVY ZAMBRANO, APRN      ENCOUNTER DATE:  08/02/2021    Sonali Lantigua / 46 y.o. / female      CHIEF COMPLAINT / REASON FOR OFFICE VISIT     Sinus Problem, Neck Pain, and Earache      ASSESSMENT & PLAN     1. Sinusitis, unspecified chronicity, unspecified location  - Augmentin twice daily for 7 days   - zyrtec daily   - Flonase two sprays twice daily      No orders of the defined types were placed in this encounter.    New Medications Ordered This Visit   Medications   • amoxicillin-clavulanate (Augmentin) 875-125 MG per tablet     Sig: Take 1 tablet by mouth 2 (Two) Times a Day for 7 days.     Dispense:  14 tablet     Refill:  0       SUMMARY/DISCUSSION  • Follow-up if symptoms do not improve or worsen.       Next Appointment with me: Visit date not found    No follow-ups on file.      VITAL SIGNS     Visit Vitals  /68 (BP Location: Left arm, Patient Position: Sitting, Cuff Size: Adult)   Pulse 69   Temp 97.5 °F (36.4 °C) (Temporal)   Ht 177.8 cm (70\")   Wt 61.1 kg (134 lb 12.8 oz)   LMP 07/04/2021 (Exact Date)   SpO2 99%   Breastfeeding No   BMI 19.34 kg/m²     Wt Readings from Last 3 Encounters:   08/02/21 61.1 kg (134 lb 12.8 oz)   07/16/21 60.5 kg (133 lb 6.4 oz)   03/29/21 61.7 kg (136 lb)     Body mass index is 19.34 kg/m².      MEDICATIONS AT THE TIME OF OFFICE VISIT     Current Outpatient Medications on File Prior to Visit   Medication Sig   • albuterol sulfate  (90 Base) MCG/ACT inhaler INHALE 2 PUFFS BY MOUTH EVERY 6 HOURS AS NEEDED FOR SHORTNESS OF AIR   • ascorbic acid (VITAMIN C) 1000 MG tablet    • calcium carbonate (TUMS) 500 MG chewable tablet Chew 1 tablet Daily.   • Cholecalciferol (Vitamin D3) 50 MCG (2000 UT) tablet    • montelukast (SINGULAIR) 10 MG tablet Take 10 mg by mouth every night at bedtime.   • Probiotic Product (PROBIOTIC DAILY PO) Take  by mouth Daily.   • traZODone (DESYREL) 50 MG tablet TAKE 1 TABLET BY MOUTH AT NIGHT AS NEEDED FOR " SLEEP   • Tri-Sprintec 0.18/0.215/0.25 MG-35 MCG per tablet Take 1 tablet by mouth Daily.   • Wixela Inhub 100-50 MCG/DOSE DISKUS INHALE 1 PUFF BY MOUTH EVERY 12 HOURS. RINSE MOUTH AFTER USE   • Zinc Sulfate (ZINC 15 PO) Take  by mouth. Take 50 mg daily   • meclizine (ANTIVERT) 25 MG tablet Take 1 tablet by mouth 3 (Three) Times a Day As Needed for Dizziness.   • multivitamin (THERAGRAN) tablet tablet Take  by mouth.   • ondansetron (Zofran) 4 MG tablet Take 1 tablet by mouth Every 8 (Eight) Hours As Needed for Nausea or Vomiting.   • pantoprazole (PROTONIX) 40 MG EC tablet TAKE 1 TABLET BY MOUTH DAILY   • triamcinolone (KENALOG) 0.025 % cream 2 (Two) Times a Day. to affected area PRN   • [DISCONTINUED] methylPREDNISolone (MEDROL) 4 MG dose pack Take as directed on package instructions.     No current facility-administered medications on file prior to visit.         HISTORY OF PRESENT ILLNESS     Patient presents from follow-up on appointment from July 16 with vertigo and nausea.  Vertigo has almost fully resolved after Medrol pack along with nausea.  She has however developed significant sinus pain and pressure in frontal and ethmoidal regions.  Denies any visual disturbances. she has been taking Flonase as needed along with Mucinex with some relief.  No fever, chills.     REVIEW OF SYSTEMS     Constitutional neg except per HPI   ENT nasal congestion/pressure   Resp neg  CV neg    PHYSICAL EXAMINATION     Physical Exam  Constitutional  No distress  ENT ethmoidal along with frontal sinus pressure and pain; PERRLA  Cardiovascular Rate  normal . Rhythm: regular . Heart sounds:  normal  Pulmonary/Chest  Effort normal. Breath sounds:  normal  Psychiatric  Alert. Judgment and thought content normal. Mood normal       REVIEWED DATA     Labs:     [unfilled]      Imaging:           Medical Tests:             Summary of old records / correspondence / consultant report:           Request outside records:           *Examiner was  wearing medical surgical mask, face shield and exam gloves during the entire duration of the visit. Patient was masked the entire time.   Minimum social distance of 6 ft maintained entire visit except if physical contact was necessary as documented.     **Dragon Disclaimer:   Much of this encounter note is an electronic transcription/translation of spoken language to printed text. The electronic translation of spoken language may permit erroneous, or at times, nonsensical words or phrases to be inadvertently transcribed. Although I have reviewed the note for such errors, some may still exist.

## 2021-08-05 RX ORDER — AZITHROMYCIN 250 MG/1
TABLET, FILM COATED ORAL
Qty: 6 TABLET | Refills: 0 | Status: SHIPPED | OUTPATIENT
Start: 2021-08-05 | End: 2021-08-10 | Stop reason: ALTCHOICE

## 2021-08-10 ENCOUNTER — OFFICE VISIT (OUTPATIENT)
Dept: INTERNAL MEDICINE | Age: 46
End: 2021-08-10

## 2021-08-10 VITALS
SYSTOLIC BLOOD PRESSURE: 90 MMHG | HEART RATE: 81 BPM | BODY MASS INDEX: 19.33 KG/M2 | DIASTOLIC BLOOD PRESSURE: 68 MMHG | WEIGHT: 135 LBS | TEMPERATURE: 97.7 F | HEIGHT: 70 IN | OXYGEN SATURATION: 98 %

## 2021-08-10 DIAGNOSIS — H69.80 DYSFUNCTION OF EUSTACHIAN TUBE, UNSPECIFIED LATERALITY: ICD-10-CM

## 2021-08-10 DIAGNOSIS — J32.1 SINUSITIS CHRONIC, FRONTAL: Primary | ICD-10-CM

## 2021-08-10 DIAGNOSIS — J30.2 SEASONAL ALLERGIES: ICD-10-CM

## 2021-08-10 PROCEDURE — 99214 OFFICE O/P EST MOD 30 MIN: CPT | Performed by: INTERNAL MEDICINE

## 2021-08-10 RX ORDER — AZELASTINE 1 MG/ML
2 SPRAY, METERED NASAL 2 TIMES DAILY
Qty: 30 ML | Refills: 5 | Status: ON HOLD | OUTPATIENT
Start: 2021-08-10 | End: 2021-10-29

## 2021-08-10 RX ORDER — METHYLPREDNISOLONE 4 MG/1
TABLET ORAL
Qty: 1 EACH | Refills: 0 | Status: ON HOLD | OUTPATIENT
Start: 2021-08-10 | End: 2021-10-29

## 2021-08-10 NOTE — PROGRESS NOTES
"    I N T E R N A L  M E D I C I N E  J U N O H  K I M,  M D      ENCOUNTER DATE:  08/10/2021    Sonali Lantigua / 46 y.o. / female      CHIEF COMPLAINT / REASON FOR OFFICE VISIT     Earache (1 month . ), Sinus Problem, and Headache      ASSESSMENT & PLAN     1. Sinusitis chronic, frontal    2. Dysfunction of Eustachian tube, unspecified laterality    3. Seasonal allergies      No orders of the defined types were placed in this encounter.    New Medications Ordered This Visit   Medications   • methylPREDNISolone (Medrol) 4 MG dose pack     Sig: Take as directed on package instructions.     Dispense:  1 each     Refill:  0   • azelastine (ASTELIN) 0.1 % nasal spray     Si sprays into the nostril(s) as directed by provider 2 (Two) Times a Day.     Dispense:  30 mL     Refill:  5       SUMMARY/DISCUSSION  • Medrol Gama   • Cetirizine 10 mg qHS, azelastine nasal spray 2 sprays BID, Simply Saline BID, continue Mucinex D BID   • If not improving go see ENT (has referral)       Next Appointment with me: 2021    No follow-ups on file.        VITAL SIGNS     Visit Vitals  BP 90/68 (BP Location: Left arm)   Pulse 81   Temp 97.7 °F (36.5 °C)   Ht 177.8 cm (70\")   Wt 61.2 kg (135 lb)   SpO2 98%   BMI 19.37 kg/m²       BP Readings from Last 3 Encounters:   08/10/21 90/68   21 110/68   21 102/70     Wt Readings from Last 3 Encounters:   08/10/21 61.2 kg (135 lb)   21 61.1 kg (134 lb 12.8 oz)   21 60.5 kg (133 lb 6.4 oz)     Body mass index is 19.37 kg/m².      MEDICATIONS AT THE TIME OF OFFICE VISIT     Current Outpatient Medications on File Prior to Visit   Medication Sig   • albuterol sulfate  (90 Base) MCG/ACT inhaler INHALE 2 PUFFS BY MOUTH EVERY 6 HOURS AS NEEDED FOR SHORTNESS OF AIR   • ascorbic acid (VITAMIN C) 1000 MG tablet    • Cholecalciferol (Vitamin D3) 50 MCG (2000 UT) tablet    • montelukast (SINGULAIR) 10 MG tablet Take 10 mg by mouth every night at bedtime.   • Phenylephrine " HCl (SUDAFED PE MAXIMUM STRENGTH PO) Take  by mouth.   • Probiotic Product (PROBIOTIC DAILY PO) Take  by mouth Daily.   • traZODone (DESYREL) 50 MG tablet TAKE 1 TABLET BY MOUTH AT NIGHT AS NEEDED FOR SLEEP   • Tri-Sprintec 0.18/0.215/0.25 MG-35 MCG per tablet Take 1 tablet by mouth Daily.   • Wixela Inhub 100-50 MCG/DOSE DISKUS INHALE 1 PUFF BY MOUTH EVERY 12 HOURS. RINSE MOUTH AFTER USE   • Zinc Sulfate (ZINC 15 PO) Take  by mouth. Take 50 mg daily   • [DISCONTINUED] azithromycin (Zithromax Z-Gama) 250 MG tablet Take 2 tablets by mouth on day 1, then 1 tablet daily on days 2-5   • calcium carbonate (TUMS) 500 MG chewable tablet Chew 1 tablet Daily.   • meclizine (ANTIVERT) 25 MG tablet Take 1 tablet by mouth 3 (Three) Times a Day As Needed for Dizziness.   • multivitamin (THERAGRAN) tablet tablet Take  by mouth.   • ondansetron (Zofran) 4 MG tablet Take 1 tablet by mouth Every 8 (Eight) Hours As Needed for Nausea or Vomiting.   • pantoprazole (PROTONIX) 40 MG EC tablet TAKE 1 TABLET BY MOUTH DAILY   • triamcinolone (KENALOG) 0.025 % cream 2 (Two) Times a Day. to affected area PRN   • [DISCONTINUED] amoxicillin-clavulanate (Augmentin) 875-125 MG per tablet Take 1 tablet by mouth 2 (Two) Times a Day for 7 days.     No current facility-administered medications on file prior to visit.          HISTORY OF PRESENT ILLNESS     Treated for vertigo following a possible viral upper respiratory tract infection in mid-July. Was treated with Medrol Gama and meclizine by APRN. Few weeks later developed sinusitis symptoms and treated initially with Augmentin and then Z-Gama. Just completed 5 days course of Z-Gama with modest improvement of symptoms. Complains of ongoing frontal sinus pressure, right ear pressure and ringing. Denies fever or chills.         REVIEW OF SYSTEMS           PHYSICAL EXAMINATION     Physical Exam  Constitutional:       General: She is not in acute distress.     Appearance: Normal appearance. She is not  ill-appearing.   HENT:      Head: Normocephalic and atraumatic.      Right Ear: Tympanic membrane and external ear normal.      Left Ear: Tympanic membrane and external ear normal.      Nose:      Right Sinus: Frontal sinus tenderness present. No maxillary sinus tenderness.      Left Sinus: Frontal sinus tenderness present. No maxillary sinus tenderness.   Eyes:      Conjunctiva/sclera: Conjunctivae normal.   Lymphadenopathy:      Cervical: No cervical adenopathy.   Neurological:      Mental Status: She is alert.           REVIEWED DATA     Labs:     Lab Results   Component Value Date     01/14/2021    K 3.2 (L) 01/14/2021    CALCIUM 9.1 01/14/2021    AST 14 01/14/2021    ALT 15 01/14/2021    BUN 14 01/14/2021    CREATININE 0.75 01/14/2021    CREATININE 0.8 06/09/2018    CREATININE 0.8 06/06/2018    EGFRIFNONA 84 01/14/2021       No results found for: HGBA1C    Lab Results   Component Value Date     (H) 06/06/2018    HDL 68 06/06/2018    TRIG 106 06/06/2018       Lab Results   Component Value Date    TSH 1.480 01/14/2021    TSH 1.210 06/06/2018    FREET4 1.15 01/14/2021    FREET4 1.10 06/06/2018       Lab Results   Component Value Date    WBC 9.00 01/14/2021    HGB 13.2 01/14/2021     01/14/2021         Imaging:           Medical Tests:           Summary of old records / correspondence / consultant report:           Request outside records:             *Examiner was wearing [id number] mask and eye protection during the entire duration of the visit. Patient was masked the entire time.   Minimum social distance of 6 ft maintained entire visit except if physical contact was necessary as documented.     **Dragon Disclaimer:   Much of this encounter note is an electronic transcription/translation of spoken language to printed text. The electronic translation of spoken language may permit erroneous, or at times, nonsensical words or phrases to be inadvertently transcribed. Although I have reviewed the note  for such errors, some may still exist.       Template created by Josue Hahn MD   Answers for HPI/ROS submitted by the patient on 8/10/2021  What is the primary reason for your visit?: Ear Pain  Affected ear: both  Chronicity: recurrent  Onset: 1 to 4 weeks ago  Progression since onset: waxing and waning  Frequency: every few hours  Fever: no fever  Pain - numeric: 4/10  ear discharge: No  rash: No  cough: No  headaches: Yes  rhinorrhea: No  diarrhea: No  sore throat: No  neck pain: Yes  vomiting: No

## 2021-09-01 DIAGNOSIS — R06.02 SHORTNESS OF BREATH: ICD-10-CM

## 2021-09-01 RX ORDER — ALBUTEROL SULFATE 90 UG/1
AEROSOL, METERED RESPIRATORY (INHALATION)
Qty: 8.5 G | Refills: 2 | Status: ON HOLD | OUTPATIENT
Start: 2021-09-01 | End: 2021-10-28

## 2021-09-14 ENCOUNTER — TRANSCRIBE ORDERS (OUTPATIENT)
Dept: ADMINISTRATIVE | Facility: HOSPITAL | Age: 46
End: 2021-09-14

## 2021-09-14 DIAGNOSIS — R13.10 DYSPHAGIA, UNSPECIFIED TYPE: Primary | ICD-10-CM

## 2021-09-20 ENCOUNTER — HOSPITAL ENCOUNTER (OUTPATIENT)
Dept: GENERAL RADIOLOGY | Facility: HOSPITAL | Age: 46
Discharge: HOME | End: 2021-09-20
Admitting: OTOLARYNGOLOGY

## 2021-09-20 DIAGNOSIS — R13.10 DYSPHAGIA, UNSPECIFIED TYPE: Primary | ICD-10-CM

## 2021-09-20 PROCEDURE — 74230 X-RAY XM SWLNG FUNCJ C+: CPT

## 2021-09-20 PROCEDURE — 92611 MOTION FLUOROSCOPY/SWALLOW: CPT

## 2021-09-20 NOTE — MBS/VFSS/FEES
Outpatient Speech Language Pathology   Adult Swallow Initial Evaluation  [facility]     Patient Name: Sonali Lantigua  : 1975  MRN: [id number]  Today's Date: 2021         Visit Date: 2021   Patient Active Problem List   Diagnosis   • COVID-19 virus infection   • Adjustment insomnia   • Shortness of breath   • Abnormal PFT   • Post-COVID syndrome   • Atypical chest pain   • Headache around the eyes   • History of COVID-19   • Holter monitor, abnormal   • Seasonal allergies        Past Medical History:   Diagnosis Date   • Allergic rhinitis         Past Surgical History:   Procedure Laterality Date   • DILATATION AND CURETTAGE           Visit Dx:     ICD-10-CM ICD-9-CM   1. Dysphagia, unspecified type  R13.10 787.20           OP SLP Assessment/Plan - 21 1257        SLP Assessment    Functional Problems  Swallowing   -CP    Impact on Function: Swallowing  Risk of malnourishment;Risk of aspiration   -CP    Clinical Impression: Swallowing  esophageal dysphagia   -CP    Please refer to items scanned into chart for additional diagnostic informaiton and handouts as provided by clinician  No   -CP    Patient would benefit from skilled therapy intervention  No   -CP       SLP Plan    Frequency  n/a   -CP      User Key  (r) = Recorded By, (t) = Taken By, (c) = Cosigned By    Initials Name Provider Type    CP Altagracia Barton MS CCC-SLP Speech and Language Pathologist          SLP Adult Swallow Evaluation     Row Name 21 1200       Rehab Evaluation    Document Type  evaluation  -CP    Subjective Information  no complaints  -CP    Patient Observations  alert;cooperative  -CP    Care Plan Review  evaluation/treatment results reviewed  -CP    Patient Effort  good  -CP    Symptoms Noted During/After Treatment  none  -CP       General Information    Patient Profile Reviewed  yes  -CP    Pertinent History Of Current Problem  The patient is a very pleasant 45 y/o F present for VFSS due to c/o food  and liquid inconsistently feeling stuck or slow to move through the throat/esophagus. She reports sensation of upward pressure on her throat. These symptoms began after the patient had COVID in 12/2020 and have been present since then. She denies history of any neurological disease or pneumonia. Laryngoscopy with ENT was unremarkable.  -CP    Current Method of Nutrition  regular textures;thin liquids  -CP    Precautions/Limitations, Vision  WFL  -CP    Precautions/Limitations, Hearing  WFL  -CP    Prior Level of Function-Communication  WFL  -CP    Prior Level of Function-Swallowing  no diet consistency restrictions  -CP    Plans/Goals Discussed with  patient  -CP    Barriers to Rehab  none identified  -CP    Patient's Goals for Discharge  -- eat and drink more efficiently and comfortably  -CP       Pain    Additional Documentation  Pain Scale: Numbers Pre/Post-Treatment (Group)  -CP       Pain Scale: Numbers Pre/Post-Treatment    Pretreatment Pain Rating  0/10 - no pain  -CP    Posttreatment Pain Rating  0/10 - no pain  -CP       MBS/VFSS    Utensils Used  spoon;cup;straw  -CP    Consistencies Trialed  thin liquids;nectar/syrup-thick liquids;pureed;soft textures;regular textures  -CP       MBS/VFSS Interpretation    VFSS Summary  The oropharyngeal swallow is WFL. Swallow initiation was timely, with adequate laryngeal vestibular closure and without significant pharyngeal residue or airway invasion across consistencies. Very shallow, trace transient penetration occurred with consecutive drinks of thin liquids via straw, but this appeared to be a normal variation. An esophageal screening was completed with nectar-thick barium, and revealed reduced esophageal motility in the mid-distal esophagus with barium retention throughout, as well as some retrograde movement. Further evaluation of the esophagus is recommended, as well as consultation with gastroenterology for further recommendations.  -CP       SLP Communication  to Radiology    Summary Statement  The oropharyngeal swallow is WFL. Swallow initiation was timely, with adequate laryngeal vestibular closure and without significant pharyngeal residue or airway invasion across consistencies. Very shallow, trace transient penetration occurred with consecutive drinks of thin liquids via straw, but this appeared to be a normal variation. An esophageal screening was completed with nectar-thick barium, and revealed reduced esophageal motility in the mid-distal esophagus with barium retention throughout, as well as some retrograde movement. Further evaluation of the esophagus is recommended, as well as consultation with gastroenterology for further recommendations.  -CP       Clinical Impression    SLP Swallowing Diagnosis  swallow WFL;esophageal dysphagia  -CP    Functional Impact  risk of aspiration/pneumonia;risk of malnutrition  -CP    Swallow Criteria for Skilled Therapeutic Interventions Met  no problems identified which require skilled intervention  -CP       Recommendations    Therapy Frequency (Swallow)  evaluation only  -CP    SLP Diet Recommendation  regular textures;thin liquids  -CP    Recommended Diagnostics  No further SLP services recommended  -CP    Recommended Precautions and Strategies  upright posture during/after eating;reflux precautions  -CP    Oral Care Recommendations  Oral Care BID/PRN  -CP    SLP Rec. for Method of Medication Administration  meds whole;as tolerated  -CP    Monitor for Signs of Aspiration  yes  -CP    Anticipated Discharge Disposition (SLP)  home  -CP    Demonstrates Need for Referral to Another Service  dedicated esophageal assessment;gastroenterology  -CP      User Key  (r) = Recorded By, (t) = Taken By, (c) = Cosigned By    Initials Name Provider Type    Altagracia Phelan MS CCC-SLP Speech and Language Pathologist                        OP SLP Education     Row Name 09/20/21 2735       Education    Barriers to Learning  No barriers  identified  -CP    Assessed  Learning needs;Learning motivation;Learning preferences;Learning readiness  -CP    Learning Motivation  Strong  -CP    Learning Method  Explanation;Demonstration  -CP    Teaching Response  Verbalized understanding  -CP    Education Comments  Reviewed images with patient and educated on swallowing physiology and esophageal observations on screening. She was in agreement with the plan for GI referral and dedicated esophageal assessment  -CP      User Key  (r) = Recorded By, (t) = Taken By, (c) = Cosigned By    Initials Name Effective Dates    Altagracia Phelan MS CCC-SLP 06/16/21 -              SLP Outcome Measures (last 72 hours)      SLP Outcome Measures     Row Name 09/20/21 1300             SLP Outcome Measures    Outcome Measure Used?  Adult NOMS  -CP         Adult FCM Scores    FCM Chosen  Swallowing  -CP      Swallowing FCM Score  7  -CP        User Key  (r) = Recorded By, (t) = Taken By, (c) = Cosigned By    Initials Name Effective Dates    Altagracia Phelan MS CCC-SLP 06/16/21 -                Time Calculation:   SLP Start Time: 1030  SLP Stop Time: 1130  SLP Time Calculation (min): 60 min    Therapy Charges for Today     Code Description Service Date Service Provider Modifiers Qty    [id number] HC ST MOTION FLUORO EVAL SWALLOW 4 9/20/2021 Altagracia Barton MS CCC-SLP GN 1                   Altagracia Barton MS CCC-SLP  9/20/2021

## 2021-10-27 ENCOUNTER — TELEPHONE (OUTPATIENT)
Dept: INTERNAL MEDICINE | Age: 46
End: 2021-10-27

## 2021-10-27 ENCOUNTER — TELEMEDICINE (OUTPATIENT)
Dept: INTERNAL MEDICINE | Age: 46
End: 2021-10-27

## 2021-10-27 DIAGNOSIS — K52.9 ACUTE GASTROENTERITIS: Primary | ICD-10-CM

## 2021-10-27 DIAGNOSIS — R11.0 NAUSEA: ICD-10-CM

## 2021-10-27 DIAGNOSIS — R19.7 DIARRHEA WITH DEHYDRATION: ICD-10-CM

## 2021-10-27 PROCEDURE — 99214 OFFICE O/P EST MOD 30 MIN: CPT | Performed by: INTERNAL MEDICINE

## 2021-10-27 RX ORDER — ONDANSETRON 4 MG/1
4 TABLET, ORALLY DISINTEGRATING ORAL EVERY 8 HOURS PRN
Qty: 20 TABLET | Refills: 0 | Status: ON HOLD | OUTPATIENT
Start: 2021-10-27 | End: 2021-10-28

## 2021-10-27 NOTE — PATIENT INSTRUCTIONS
** IMPORTANT MESSAGE FROM DR. PETERS **    In our office, your satisfaction is VERY important to us.     You may receive a survey from [facility] by mail or E-mail for you to provide feedback about your visit. This information is invaluable for me to know what we can do to improve our services.     I ask that you please take a few minutes to complete the survey and let us know how we are doing in serving your needs. (You may receive the survey more than once for multiple visits)    Thank You !    Dr. Peters & Staff    _________________________________________________________________________________________________________________________      ** ADDITIONAL INSTRUCTION / REMINDERS FROM DR. PETERS **

## 2021-10-27 NOTE — PROGRESS NOTES
I N T E R N A L  M E D I C I N E  J U N O H  K I M,  M D      ENCOUNTER DATE:  10/27/2021    Sonali Lantigua / 46 y.o. / female      THIS WAS A MYCHART TELEHEALTH ENCOUNTER NECESSITATED BY CURRENT COVID-19 CRISIS.      You have chosen to receive care through a telehealth visit.  Do you consent to use a video/audio connection for your medical care today? Yes      CHIEF COMPLAINT / REASON FOR OFFICE VISIT     TELEHEALTH ENCOUNTER:  Diarrhea      ASSESSMENT & PLAN     1. Acute gastroenteritis    2. Diarrhea with dehydration    3. Nausea         No orders of the defined types were placed in this encounter.    New Medications Ordered This Visit   Medications   • ondansetron ODT (Zofran ODT) 4 MG disintegrating tablet     Sig: Place 1 tablet on the tongue Every 8 (Eight) Hours As Needed for Nausea or Vomiting.     Dispense:  20 tablet     Refill:  0       SUMMARY/DISCUSSION  • Push hydration with Gatorade  • Zofran PRN nausea  • Monitor urine output and blood pressure   • Advised to go to ER if worsening diarrhea; or if having vomiting / abdominal pain or worsening generalized weakness.   • Send update tomorrow/friday.       Time spent: 28 minutes    Next Appointment with me: 12/9/2021    No follow-ups on file.    .     HISTORY OF PRESENT ILLNESS     5 days ago had pharyngitis symptoms with tonsillar exudates. Did not have severe sore throat, cervical lymphadenopathy or high fever. It went away on its own. Yesterday had chills. Today developed profuse watery diarrhea without blood. No abdominal pain. + nausea without vomiting. Several days ago ate some fish which she thought could have been undercooked. Denies recent antibiotic use. Has generalized fatigue, making urine.         REVIEWED DATA     Labs:           Imaging:           Medical Tests:           Summary of old records / correspondence / consultant report:           Request outside records:           **Jaime Disclaimer:   Much of this encounter note is an  electronic transcription/translation of spoken language to printed text. The electronic translation of spoken language may permit erroneous, or at times, nonsensical words or phrases to be inadvertently transcribed. Although I have reviewed the note for such errors, some may still exist.     Template created by Josue Hahn MD

## 2021-10-27 NOTE — TELEPHONE ENCOUNTER
Caller: Nickobhavya Sonali    Relationship: Self    Best call back number: [phone]    What medication are you requesting:     What are your current symptoms: STOMACH BUG/ DIARRHEA/CHILLS     How long have you been experiencing symptoms: LAST NIGHT     Have you had these symptoms before:    [] Yes  [x] No    Have you been treated for these symptoms before:   [] Yes  [x] No    If a prescription is needed, what is your preferred pharmacy and phone number:  [id number] [address] AT [facility]([place] - [phone]  - [phone]   [phone]    Additional notes:   PATIENT SAID SHE WAS TESTED FOR COVID AND IT IS NEGATIVE BUT SHE THINKS SHE MIGHT HAVE FOOD POISONING. SHE WANTED TO KNOW WHEN SHE SHOULD BE CONCERNED OR IF SHE NEEDS AN APPOINTMENT.;

## 2021-10-28 ENCOUNTER — HOSPITAL ENCOUNTER (OUTPATIENT)
Facility: HOSPITAL | Age: 46
Setting detail: OBSERVATION
Discharge: HOME | End: 2021-10-31
Attending: EMERGENCY MEDICINE | Admitting: HOSPITALIST

## 2021-10-28 ENCOUNTER — APPOINTMENT (OUTPATIENT)
Dept: CT IMAGING | Facility: HOSPITAL | Age: 46
End: 2021-10-28

## 2021-10-28 DIAGNOSIS — F51.02 ADJUSTMENT INSOMNIA: ICD-10-CM

## 2021-10-28 DIAGNOSIS — A09 INFECTIOUS COLITIS: Primary | ICD-10-CM

## 2021-10-28 DIAGNOSIS — A03.9 SHIGELLA GASTROENTERITIS: ICD-10-CM

## 2021-10-28 LAB
ADV 40+41 DNA STL QL NAA+NON-PROBE: NOT DETECTED
ALBUMIN SERPL-MCNC: 3.4 G/DL (ref 3.5–5.2)
ALBUMIN/GLOB SERPL: 1.2 G/DL
ALP SERPL-CCNC: 15 U/L (ref 39–117)
ALT SERPL W P-5'-P-CCNC: 16 U/L (ref 1–33)
ANION GAP SERPL CALCULATED.3IONS-SCNC: 6 MMOL/L (ref 5–15)
AST SERPL-CCNC: 20 U/L (ref 1–32)
ASTRO TYP 1-8 RNA STL QL NAA+NON-PROBE: NOT DETECTED
BACTERIA UR QL AUTO: ABNORMAL /HPF
BILIRUB SERPL-MCNC: 0.5 MG/DL (ref 0–1.2)
BILIRUB UR QL STRIP: NEGATIVE
BUN SERPL-MCNC: 9 MG/DL (ref 6–20)
BUN/CREAT SERPL: 11.7 (ref 7–25)
C CAYETANENSIS DNA STL QL NAA+NON-PROBE: NOT DETECTED
C COLI+JEJ+UPSA DNA STL QL NAA+NON-PROBE: NOT DETECTED
C DIFF TOX GENS STL QL NAA+PROBE: NEGATIVE
CALCIUM SPEC-SCNC: 8.4 MG/DL (ref 8.6–10.5)
CHLORIDE SERPL-SCNC: 101 MMOL/L (ref 98–107)
CLARITY UR: ABNORMAL
CO2 SERPL-SCNC: 22 MMOL/L (ref 22–29)
COLOR UR: ABNORMAL
CREAT SERPL-MCNC: 0.77 MG/DL (ref 0.57–1)
CRYPTOSP DNA STL QL NAA+NON-PROBE: NOT DETECTED
D-LACTATE SERPL-SCNC: 1.1 MMOL/L (ref 0.5–2)
DEPRECATED RDW RBC AUTO: 40.3 FL (ref 37–54)
E HISTOLYT DNA STL QL NAA+NON-PROBE: NOT DETECTED
EAEC PAA PLAS AGGR+AATA ST NAA+NON-PRB: NOT DETECTED
EC STX1+STX2 GENES STL QL NAA+NON-PROBE: NOT DETECTED
EPEC EAE GENE STL QL NAA+NON-PROBE: NOT DETECTED
ERYTHROCYTE [DISTWIDTH] IN BLOOD BY AUTOMATED COUNT: 11.3 % (ref 12.3–15.4)
ETEC LTA+ST1A+ST1B TOX ST NAA+NON-PROBE: NOT DETECTED
G LAMBLIA DNA STL QL NAA+NON-PROBE: NOT DETECTED
GFR SERPL CREATININE-BSD FRML MDRD: 81 ML/MIN/1.73
GLOBULIN UR ELPH-MCNC: 2.8 GM/DL
GLUCOSE BLDC GLUCOMTR-MCNC: 87 MG/DL (ref 70–130)
GLUCOSE BLDC GLUCOMTR-MCNC: 97 MG/DL (ref 70–130)
GLUCOSE SERPL-MCNC: 92 MG/DL (ref 65–99)
GLUCOSE UR STRIP-MCNC: NEGATIVE MG/DL
HCG SERPL QL: NEGATIVE
HCT VFR BLD AUTO: 39.9 % (ref 34–46.6)
HGB BLD-MCNC: 13.3 G/DL (ref 12–15.9)
HGB UR QL STRIP.AUTO: NEGATIVE
HYALINE CASTS UR QL AUTO: ABNORMAL /LPF
INR PPP: 1.05 (ref 0.9–1.1)
KETONES UR QL STRIP: ABNORMAL
LEUKOCYTE ESTERASE UR QL STRIP.AUTO: ABNORMAL
LYMPHOCYTES # BLD MANUAL: 0.84 10*3/MM3 (ref 0.7–3.1)
LYMPHOCYTES NFR BLD MANUAL: 11.1 % (ref 5–12)
LYMPHOCYTES NFR BLD MANUAL: 13.6 % (ref 19.6–45.3)
MAGNESIUM SERPL-MCNC: 1.7 MG/DL (ref 1.6–2.6)
MCH RBC QN AUTO: 32.2 PG (ref 26.6–33)
MCHC RBC AUTO-ENTMCNC: 33.3 G/DL (ref 31.5–35.7)
MCV RBC AUTO: 96.6 FL (ref 79–97)
MONOCYTES # BLD AUTO: 0.68 10*3/MM3 (ref 0.1–0.9)
NEUTROPHILS # BLD AUTO: 4.64 10*3/MM3 (ref 1.7–7)
NEUTROPHILS NFR BLD MANUAL: 75.3 % (ref 42.7–76)
NITRITE UR QL STRIP: NEGATIVE
NOROVIRUS GI+II RNA STL QL NAA+NON-PROBE: NOT DETECTED
P SHIGELLOIDES DNA STL QL NAA+NON-PROBE: NOT DETECTED
PH UR STRIP.AUTO: 5.5 [PH] (ref 5–8)
PLAT MORPH BLD: NORMAL
PLATELET # BLD AUTO: 164 10*3/MM3 (ref 140–450)
PMV BLD AUTO: 10.6 FL (ref 6–12)
POTASSIUM SERPL-SCNC: 3.5 MMOL/L (ref 3.5–5.2)
PROCALCITONIN SERPL-MCNC: 0.17 NG/ML (ref 0–0.25)
PROT SERPL-MCNC: 6.2 G/DL (ref 6–8.5)
PROT UR QL STRIP: ABNORMAL
PROTHROMBIN TIME: 13.5 SECONDS (ref 11.7–14.2)
RBC # BLD AUTO: 4.13 10*6/MM3 (ref 3.77–5.28)
RBC # UR: ABNORMAL /HPF
RBC MORPH BLD: NORMAL
REF LAB TEST METHOD: ABNORMAL
RVA RNA STL QL NAA+NON-PROBE: NOT DETECTED
S ENT+BONG DNA STL QL NAA+NON-PROBE: NOT DETECTED
SAPO I+II+IV+V RNA STL QL NAA+NON-PROBE: NOT DETECTED
SARS-COV-2 RNA PNL SPEC NAA+PROBE: NOT DETECTED
SHIGELLA SP+EIEC IPAH ST NAA+NON-PROBE: DETECTED
SMUDGE CELLS BLD QL SMEAR: ABNORMAL
SODIUM SERPL-SCNC: 129 MMOL/L (ref 136–145)
SP GR UR STRIP: 1.02 (ref 1–1.03)
SQUAMOUS #/AREA URNS HPF: ABNORMAL /HPF
STARCH GRANULES URNS QL MICRO: ABNORMAL /HPF
UROBILINOGEN UR QL STRIP: ABNORMAL
V CHOL+PARA+VUL DNA STL QL NAA+NON-PROBE: NOT DETECTED
V CHOLERAE DNA STL QL NAA+NON-PROBE: NOT DETECTED
WBC # BLD AUTO: 6.16 10*3/MM3 (ref 3.4–10.8)
WBC UR QL AUTO: ABNORMAL /HPF
Y ENTEROCOL DNA STL QL NAA+NON-PROBE: NOT DETECTED

## 2021-10-28 PROCEDURE — 99254 IP/OBS CNSLTJ NEW/EST MOD 60: CPT | Performed by: INTERNAL MEDICINE

## 2021-10-28 PROCEDURE — 87040 BLOOD CULTURE FOR BACTERIA: CPT | Performed by: EMERGENCY MEDICINE

## 2021-10-28 PROCEDURE — 96375 TX/PRO/DX INJ NEW DRUG ADDON: CPT

## 2021-10-28 PROCEDURE — 85007 BL SMEAR W/DIFF WBC COUNT: CPT | Performed by: EMERGENCY MEDICINE

## 2021-10-28 PROCEDURE — 82962 GLUCOSE BLOOD TEST: CPT

## 2021-10-28 PROCEDURE — 87081 CULTURE SCREEN ONLY: CPT | Performed by: EMERGENCY MEDICINE

## 2021-10-28 PROCEDURE — 85610 PROTHROMBIN TIME: CPT | Performed by: EMERGENCY MEDICINE

## 2021-10-28 PROCEDURE — 80053 COMPREHEN METABOLIC PANEL: CPT | Performed by: EMERGENCY MEDICINE

## 2021-10-28 PROCEDURE — 84703 CHORIONIC GONADOTROPIN ASSAY: CPT | Performed by: EMERGENCY MEDICINE

## 2021-10-28 PROCEDURE — 74177 CT ABD & PELVIS W/CONTRAST: CPT

## 2021-10-28 PROCEDURE — 25010000002 CEFTRIAXONE PER 250 MG: Performed by: EMERGENCY MEDICINE

## 2021-10-28 PROCEDURE — 0097U HC BIOFIRE FILMARRAY GI PANEL: CPT | Performed by: EMERGENCY MEDICINE

## 2021-10-28 PROCEDURE — G0378 HOSPITAL OBSERVATION PER HR: HCPCS

## 2021-10-28 PROCEDURE — 83735 ASSAY OF MAGNESIUM: CPT | Performed by: EMERGENCY MEDICINE

## 2021-10-28 PROCEDURE — 25010000002 IOPAMIDOL 61 % SOLUTION: Performed by: EMERGENCY MEDICINE

## 2021-10-28 PROCEDURE — 25010000002 KETOROLAC TROMETHAMINE PER 15 MG: Performed by: NURSE PRACTITIONER

## 2021-10-28 PROCEDURE — 81001 URINALYSIS AUTO W/SCOPE: CPT | Performed by: EMERGENCY MEDICINE

## 2021-10-28 PROCEDURE — 84145 PROCALCITONIN (PCT): CPT | Performed by: EMERGENCY MEDICINE

## 2021-10-28 PROCEDURE — 96374 THER/PROPH/DIAG INJ IV PUSH: CPT

## 2021-10-28 PROCEDURE — C9803 HOPD COVID-19 SPEC COLLECT: HCPCS

## 2021-10-28 PROCEDURE — 87493 C DIFF AMPLIFIED PROBE: CPT | Performed by: EMERGENCY MEDICINE

## 2021-10-28 PROCEDURE — 99284 EMERGENCY DEPT VISIT MOD MDM: CPT

## 2021-10-28 PROCEDURE — 87635 SARS-COV-2 COVID-19 AMP PRB: CPT | Performed by: EMERGENCY MEDICINE

## 2021-10-28 PROCEDURE — 83605 ASSAY OF LACTIC ACID: CPT | Performed by: EMERGENCY MEDICINE

## 2021-10-28 PROCEDURE — 85025 COMPLETE CBC W/AUTO DIFF WBC: CPT | Performed by: EMERGENCY MEDICINE

## 2021-10-28 RX ORDER — CALCIUM CARBONATE 200(500)MG
1 TABLET,CHEWABLE ORAL DAILY
Status: DISCONTINUED | OUTPATIENT
Start: 2021-10-28 | End: 2021-10-31 | Stop reason: HOSPADM

## 2021-10-28 RX ORDER — ALBUTEROL SULFATE 90 UG/1
2 AEROSOL, METERED RESPIRATORY (INHALATION) EVERY 6 HOURS PRN
Status: DISCONTINUED | OUTPATIENT
Start: 2021-10-28 | End: 2021-10-31 | Stop reason: HOSPADM

## 2021-10-28 RX ORDER — MONTELUKAST SODIUM 10 MG/1
10 TABLET ORAL NIGHTLY
Status: DISCONTINUED | OUTPATIENT
Start: 2021-10-28 | End: 2021-10-31 | Stop reason: HOSPADM

## 2021-10-28 RX ORDER — DIPHENOXYLATE HYDROCHLORIDE AND ATROPINE SULFATE 2.5; .025 MG/1; MG/1
1 TABLET ORAL DAILY
Status: DISCONTINUED | OUTPATIENT
Start: 2021-10-28 | End: 2021-10-31 | Stop reason: HOSPADM

## 2021-10-28 RX ORDER — TRAZODONE HYDROCHLORIDE 50 MG/1
50 TABLET ORAL NIGHTLY PRN
Status: DISCONTINUED | OUTPATIENT
Start: 2021-10-28 | End: 2021-10-31 | Stop reason: HOSPADM

## 2021-10-28 RX ORDER — KETOROLAC TROMETHAMINE 15 MG/ML
15 INJECTION, SOLUTION INTRAMUSCULAR; INTRAVENOUS ONCE
Status: COMPLETED | OUTPATIENT
Start: 2021-10-28 | End: 2021-10-28

## 2021-10-28 RX ORDER — L.ACID,PARA/B.BIFIDUM/S.THERM 8B CELL
1 CAPSULE ORAL DAILY
Status: DISCONTINUED | OUTPATIENT
Start: 2021-10-28 | End: 2021-10-31 | Stop reason: HOSPADM

## 2021-10-28 RX ORDER — SODIUM CHLORIDE 0.9 % (FLUSH) 0.9 %
10 SYRINGE (ML) INJECTION AS NEEDED
Status: DISCONTINUED | OUTPATIENT
Start: 2021-10-28 | End: 2021-10-31 | Stop reason: HOSPADM

## 2021-10-28 RX ORDER — ACETAMINOPHEN 325 MG/1
650 TABLET ORAL EVERY 4 HOURS PRN
Status: DISCONTINUED | OUTPATIENT
Start: 2021-10-28 | End: 2021-10-31 | Stop reason: HOSPADM

## 2021-10-28 RX ORDER — SODIUM CHLORIDE, SODIUM LACTATE, POTASSIUM CHLORIDE, CALCIUM CHLORIDE 600; 310; 30; 20 MG/100ML; MG/100ML; MG/100ML; MG/100ML
75 INJECTION, SOLUTION INTRAVENOUS CONTINUOUS
Status: DISCONTINUED | OUTPATIENT
Start: 2021-10-28 | End: 2021-10-31 | Stop reason: HOSPADM

## 2021-10-28 RX ORDER — MELATONIN
1000 DAILY
Status: DISCONTINUED | OUTPATIENT
Start: 2021-10-28 | End: 2021-10-31 | Stop reason: HOSPADM

## 2021-10-28 RX ORDER — ZINC SULFATE 50(220)MG
220 CAPSULE ORAL DAILY
Status: DISCONTINUED | OUTPATIENT
Start: 2021-10-28 | End: 2021-10-31 | Stop reason: HOSPADM

## 2021-10-28 RX ORDER — ASCORBIC ACID 500 MG
250 TABLET ORAL DAILY
Status: DISCONTINUED | OUTPATIENT
Start: 2021-10-28 | End: 2021-10-31 | Stop reason: HOSPADM

## 2021-10-28 RX ORDER — SODIUM CHLORIDE 9 MG/ML
125 INJECTION, SOLUTION INTRAVENOUS CONTINUOUS
Status: DISCONTINUED | OUTPATIENT
Start: 2021-10-28 | End: 2021-10-29

## 2021-10-28 RX ADMIN — SODIUM CHLORIDE 125 ML/HR: 9 INJECTION, SOLUTION INTRAVENOUS at 14:07

## 2021-10-28 RX ADMIN — CEFTRIAXONE 1 G: 1 INJECTION, POWDER, FOR SOLUTION INTRAMUSCULAR; INTRAVENOUS at 16:10

## 2021-10-28 RX ADMIN — MONTELUKAST SODIUM 10 MG: 10 TABLET, FILM COATED ORAL at 21:31

## 2021-10-28 RX ADMIN — IOPAMIDOL 85 ML: 612 INJECTION, SOLUTION INTRAVENOUS at 13:48

## 2021-10-28 RX ADMIN — ACETAMINOPHEN 650 MG: 325 TABLET, FILM COATED ORAL at 21:31

## 2021-10-28 RX ADMIN — KETOROLAC TROMETHAMINE 15 MG: 15 INJECTION, SOLUTION INTRAMUSCULAR; INTRAVENOUS at 21:31

## 2021-10-28 RX ADMIN — SODIUM CHLORIDE, POTASSIUM CHLORIDE, SODIUM LACTATE AND CALCIUM CHLORIDE 100 ML/HR: 600; 310; 30; 20 INJECTION, SOLUTION INTRAVENOUS at 21:31

## 2021-10-28 RX ADMIN — SODIUM CHLORIDE 1000 ML: 9 INJECTION, SOLUTION INTRAVENOUS at 12:18

## 2021-10-29 PROBLEM — R19.7 DIARRHEA: Status: ACTIVE | Noted: 2021-10-29

## 2021-10-29 PROBLEM — E87.1 HYPONATREMIA: Status: ACTIVE | Noted: 2021-10-29

## 2021-10-29 PROBLEM — A04.2: Status: ACTIVE | Noted: 2021-10-29

## 2021-10-29 LAB — GLUCOSE BLDC GLUCOMTR-MCNC: 94 MG/DL (ref 70–130)

## 2021-10-29 PROCEDURE — 25010000002 CEFTRIAXONE PER 250 MG: Performed by: INTERNAL MEDICINE

## 2021-10-29 PROCEDURE — 96365 THER/PROPH/DIAG IV INF INIT: CPT

## 2021-10-29 PROCEDURE — 25010000002 MORPHINE PER 10 MG: Performed by: HOSPITALIST

## 2021-10-29 PROCEDURE — 96375 TX/PRO/DX INJ NEW DRUG ADDON: CPT

## 2021-10-29 PROCEDURE — 96376 TX/PRO/DX INJ SAME DRUG ADON: CPT

## 2021-10-29 PROCEDURE — G0378 HOSPITAL OBSERVATION PER HR: HCPCS

## 2021-10-29 PROCEDURE — 25010000002 ONDANSETRON PER 1 MG: Performed by: NURSE PRACTITIONER

## 2021-10-29 PROCEDURE — 99232 SBSQ HOSP IP/OBS MODERATE 35: CPT | Performed by: INTERNAL MEDICINE

## 2021-10-29 PROCEDURE — 82962 GLUCOSE BLOOD TEST: CPT

## 2021-10-29 RX ORDER — MORPHINE SULFATE 2 MG/ML
2 INJECTION, SOLUTION INTRAMUSCULAR; INTRAVENOUS EVERY 4 HOURS PRN
Status: DISCONTINUED | OUTPATIENT
Start: 2021-10-29 | End: 2021-10-31 | Stop reason: HOSPADM

## 2021-10-29 RX ORDER — ONDANSETRON 2 MG/ML
4 INJECTION INTRAMUSCULAR; INTRAVENOUS EVERY 6 HOURS PRN
Status: DISCONTINUED | OUTPATIENT
Start: 2021-10-29 | End: 2021-10-31 | Stop reason: HOSPADM

## 2021-10-29 RX ORDER — MORPHINE SULFATE 2 MG/ML
1 INJECTION, SOLUTION INTRAMUSCULAR; INTRAVENOUS EVERY 4 HOURS PRN
Status: DISCONTINUED | OUTPATIENT
Start: 2021-10-29 | End: 2021-10-31 | Stop reason: HOSPADM

## 2021-10-29 RX ADMIN — MORPHINE SULFATE 1 MG: 2 INJECTION, SOLUTION INTRAMUSCULAR; INTRAVENOUS at 08:05

## 2021-10-29 RX ADMIN — MONTELUKAST SODIUM 10 MG: 10 TABLET, FILM COATED ORAL at 20:45

## 2021-10-29 RX ADMIN — CEFTRIAXONE 1 G: 1 INJECTION, POWDER, FOR SOLUTION INTRAMUSCULAR; INTRAVENOUS at 18:16

## 2021-10-29 RX ADMIN — ONDANSETRON 4 MG: 2 INJECTION INTRAMUSCULAR; INTRAVENOUS at 22:03

## 2021-10-29 RX ADMIN — MORPHINE SULFATE 1 MG: 2 INJECTION, SOLUTION INTRAMUSCULAR; INTRAVENOUS at 15:59

## 2021-10-29 RX ADMIN — Medication 1 CAPSULE: at 08:06

## 2021-10-29 RX ADMIN — ACETAMINOPHEN 650 MG: 325 TABLET, FILM COATED ORAL at 22:14

## 2021-10-29 RX ADMIN — ACETAMINOPHEN 650 MG: 325 TABLET, FILM COATED ORAL at 02:51

## 2021-10-30 LAB
ANION GAP SERPL CALCULATED.3IONS-SCNC: 8.7 MMOL/L (ref 5–15)
BUN SERPL-MCNC: 3 MG/DL (ref 6–20)
BUN/CREAT SERPL: 5.5 (ref 7–25)
CALCIUM SPEC-SCNC: 7.8 MG/DL (ref 8.6–10.5)
CHLORIDE SERPL-SCNC: 107 MMOL/L (ref 98–107)
CO2 SERPL-SCNC: 24.3 MMOL/L (ref 22–29)
CREAT SERPL-MCNC: 0.55 MG/DL (ref 0.57–1)
DEPRECATED RDW RBC AUTO: 39.8 FL (ref 37–54)
ERYTHROCYTE [DISTWIDTH] IN BLOOD BY AUTOMATED COUNT: 11.5 % (ref 12.3–15.4)
GFR SERPL CREATININE-BSD FRML MDRD: 120 ML/MIN/1.73
GLUCOSE SERPL-MCNC: 127 MG/DL (ref 65–99)
HCT VFR BLD AUTO: 32.9 % (ref 34–46.6)
HGB BLD-MCNC: 11.2 G/DL (ref 12–15.9)
MCH RBC QN AUTO: 32.4 PG (ref 26.6–33)
MCHC RBC AUTO-ENTMCNC: 34 G/DL (ref 31.5–35.7)
MCV RBC AUTO: 95.1 FL (ref 79–97)
PLATELET # BLD AUTO: 162 10*3/MM3 (ref 140–450)
PMV BLD AUTO: 10.9 FL (ref 6–12)
POTASSIUM SERPL-SCNC: 3.7 MMOL/L (ref 3.5–5.2)
RBC # BLD AUTO: 3.46 10*6/MM3 (ref 3.77–5.28)
SODIUM SERPL-SCNC: 140 MMOL/L (ref 136–145)
WBC # BLD AUTO: 4.8 10*3/MM3 (ref 3.4–10.8)

## 2021-10-30 PROCEDURE — 85027 COMPLETE CBC AUTOMATED: CPT | Performed by: HOSPITALIST

## 2021-10-30 PROCEDURE — 96361 HYDRATE IV INFUSION ADD-ON: CPT

## 2021-10-30 PROCEDURE — 25010000002 ONDANSETRON PER 1 MG: Performed by: NURSE PRACTITIONER

## 2021-10-30 PROCEDURE — 80048 BASIC METABOLIC PNL TOTAL CA: CPT | Performed by: HOSPITALIST

## 2021-10-30 PROCEDURE — G0378 HOSPITAL OBSERVATION PER HR: HCPCS

## 2021-10-30 PROCEDURE — 99232 SBSQ HOSP IP/OBS MODERATE 35: CPT | Performed by: INTERNAL MEDICINE

## 2021-10-30 PROCEDURE — 96376 TX/PRO/DX INJ SAME DRUG ADON: CPT

## 2021-10-30 PROCEDURE — 25010000002 CEFTRIAXONE PER 250 MG: Performed by: INTERNAL MEDICINE

## 2021-10-30 PROCEDURE — 96366 THER/PROPH/DIAG IV INF ADDON: CPT

## 2021-10-30 RX ORDER — DICYCLOMINE HYDROCHLORIDE 10 MG/1
20 CAPSULE ORAL 4 TIMES DAILY
Status: DISCONTINUED | OUTPATIENT
Start: 2021-10-30 | End: 2021-10-31 | Stop reason: HOSPADM

## 2021-10-30 RX ADMIN — ACETAMINOPHEN 650 MG: 325 TABLET, FILM COATED ORAL at 22:05

## 2021-10-30 RX ADMIN — TRAZODONE HYDROCHLORIDE 50 MG: 50 TABLET ORAL at 23:26

## 2021-10-30 RX ADMIN — Medication 1 CAPSULE: at 09:47

## 2021-10-30 RX ADMIN — ACETAMINOPHEN 650 MG: 325 TABLET, FILM COATED ORAL at 17:59

## 2021-10-30 RX ADMIN — ONDANSETRON 4 MG: 2 INJECTION INTRAMUSCULAR; INTRAVENOUS at 13:22

## 2021-10-30 RX ADMIN — SODIUM CHLORIDE, POTASSIUM CHLORIDE, SODIUM LACTATE AND CALCIUM CHLORIDE 100 ML/HR: 600; 310; 30; 20 INJECTION, SOLUTION INTRAVENOUS at 06:08

## 2021-10-30 RX ADMIN — MONTELUKAST SODIUM 10 MG: 10 TABLET, FILM COATED ORAL at 20:24

## 2021-10-30 RX ADMIN — Medication 1 TABLET: at 09:47

## 2021-10-30 RX ADMIN — CEFTRIAXONE 1 G: 1 INJECTION, POWDER, FOR SOLUTION INTRAMUSCULAR; INTRAVENOUS at 15:19

## 2021-10-30 RX ADMIN — SODIUM CHLORIDE, POTASSIUM CHLORIDE, SODIUM LACTATE AND CALCIUM CHLORIDE 100 ML/HR: 600; 310; 30; 20 INJECTION, SOLUTION INTRAVENOUS at 12:25

## 2021-10-30 RX ADMIN — ONDANSETRON 4 MG: 2 INJECTION INTRAMUSCULAR; INTRAVENOUS at 20:24

## 2021-10-30 RX ADMIN — Medication 220 MG: at 09:48

## 2021-10-30 RX ADMIN — Medication 1000 UNITS: at 09:47

## 2021-10-30 RX ADMIN — ONDANSETRON 4 MG: 2 INJECTION INTRAMUSCULAR; INTRAVENOUS at 06:07

## 2021-10-30 RX ADMIN — OXYCODONE HYDROCHLORIDE AND ACETAMINOPHEN 250 MG: 500 TABLET ORAL at 09:48

## 2021-10-30 RX ADMIN — SODIUM CHLORIDE, POTASSIUM CHLORIDE, SODIUM LACTATE AND CALCIUM CHLORIDE 75 ML/HR: 600; 310; 30; 20 INJECTION, SOLUTION INTRAVENOUS at 23:28

## 2021-10-31 ENCOUNTER — READMISSION MANAGEMENT (OUTPATIENT)
Dept: CALL CENTER | Facility: HOSPITAL | Age: 46
End: 2021-10-31

## 2021-10-31 VITALS
HEART RATE: 71 BPM | BODY MASS INDEX: 19.76 KG/M2 | TEMPERATURE: 97 F | RESPIRATION RATE: 16 BRPM | WEIGHT: 138 LBS | SYSTOLIC BLOOD PRESSURE: 110 MMHG | OXYGEN SATURATION: 90 % | HEIGHT: 70 IN | DIASTOLIC BLOOD PRESSURE: 70 MMHG

## 2021-10-31 LAB — QT INTERVAL: 421 MS

## 2021-10-31 PROCEDURE — 99232 SBSQ HOSP IP/OBS MODERATE 35: CPT | Performed by: NURSE PRACTITIONER

## 2021-10-31 PROCEDURE — 25010000002 CEFTRIAXONE PER 250 MG: Performed by: INTERNAL MEDICINE

## 2021-10-31 PROCEDURE — G0378 HOSPITAL OBSERVATION PER HR: HCPCS

## 2021-10-31 PROCEDURE — 93010 ELECTROCARDIOGRAM REPORT: CPT | Performed by: INTERNAL MEDICINE

## 2021-10-31 PROCEDURE — 93005 ELECTROCARDIOGRAM TRACING: CPT | Performed by: NURSE PRACTITIONER

## 2021-10-31 RX ORDER — CIPROFLOXACIN 500 MG/1
500 TABLET, FILM COATED ORAL EVERY 12 HOURS SCHEDULED
Status: DISCONTINUED | OUTPATIENT
Start: 2021-10-31 | End: 2021-10-31 | Stop reason: HOSPADM

## 2021-10-31 RX ORDER — CIPROFLOXACIN 500 MG/1
500 TABLET, FILM COATED ORAL EVERY 12 HOURS SCHEDULED
Qty: 14 TABLET | Refills: 0 | Status: SHIPPED | OUTPATIENT
Start: 2021-10-31 | End: 2021-11-07

## 2021-10-31 RX ORDER — METRONIDAZOLE 500 MG/1
500 TABLET ORAL EVERY 8 HOURS SCHEDULED
Qty: 21 TABLET | Refills: 0 | Status: SHIPPED | OUTPATIENT
Start: 2021-10-31 | End: 2021-11-07

## 2021-10-31 RX ORDER — ONDANSETRON 4 MG/1
4 TABLET, ORALLY DISINTEGRATING ORAL EVERY 8 HOURS PRN
Qty: 10 TABLET | Refills: 0 | Status: SHIPPED | OUTPATIENT
Start: 2021-10-31 | End: 2022-08-11

## 2021-10-31 RX ORDER — METRONIDAZOLE 500 MG/1
500 TABLET ORAL EVERY 8 HOURS SCHEDULED
Status: DISCONTINUED | OUTPATIENT
Start: 2021-10-31 | End: 2021-10-31 | Stop reason: HOSPADM

## 2021-10-31 RX ADMIN — OXYCODONE HYDROCHLORIDE AND ACETAMINOPHEN 250 MG: 500 TABLET ORAL at 09:26

## 2021-10-31 RX ADMIN — Medication 220 MG: at 09:27

## 2021-10-31 RX ADMIN — CEFTRIAXONE 1 G: 1 INJECTION, POWDER, FOR SOLUTION INTRAMUSCULAR; INTRAVENOUS at 15:12

## 2021-10-31 RX ADMIN — Medication 1 CAPSULE: at 09:26

## 2021-10-31 RX ADMIN — Medication 1000 UNITS: at 09:27

## 2021-10-31 RX ADMIN — Medication 1 TABLET: at 09:27

## 2021-10-31 NOTE — OUTREACH NOTE
Prep Survey      Responses   [facility] facility patient discharged from? [place]   Is LACE score < 7 ? Yes   Emergency Room discharge w/ pulse ox? No   Eligibility [facility]   Date of Admission 10/28/21   Date of Discharge 10/31/21   Discharge Disposition Home or Self Care   Discharge diagnosis diarrhea,  Enteroinvasive Escherichia coli infection, hyponatremia   Does the patient have one of the following disease processes/diagnoses(primary or secondary)? Other   Does the patient have Home health ordered? No   Is there a DME ordered? No   Prep survey completed? Yes          Moni Dempsey RN

## 2021-11-01 ENCOUNTER — TRANSITIONAL CARE MANAGEMENT TELEPHONE ENCOUNTER (OUTPATIENT)
Dept: CALL CENTER | Facility: HOSPITAL | Age: 46
End: 2021-11-01

## 2021-11-01 NOTE — OUTREACH NOTE
Call Center TCM Note      Responses   [facility] patient discharged from? [place]   Does the patient have one of the following disease processes/diagnoses(primary or secondary)? Other   TCM attempt successful? No   Unsuccessful attempts Attempt 1          Whitney Lopez MA    11/1/2021, 13:57 EDT

## 2021-11-01 NOTE — PROGRESS NOTES
Case Management Discharge Note      Final Note: home         Selected Continued Care - Discharged on 10/31/2021 Admission date: 10/28/2021 - Discharge disposition: Home or Self Care    Destination    No services have been selected for the patient.              Durable Medical Equipment    No services have been selected for the patient.              Dialysis/Infusion    No services have been selected for the patient.              Home Medical Care    No services have been selected for the patient.              Therapy    No services have been selected for the patient.              Community Resources    No services have been selected for the patient.              Community & DME    No services have been selected for the patient.                  Transportation Services  Private: Car    Final Discharge Disposition Code: 01 - home or self-care

## 2021-11-01 NOTE — OUTREACH NOTE
Call Center TCM Note      Responses   [facility] patient discharged from? [place]   Does the patient have one of the following disease processes/diagnoses(primary or secondary)? Other   TCM attempt successful? Yes   Discharge diagnosis diarrhea,  Enteroinvasive Escherichia coli infection, hyponatremia   Meds reviewed with patient/caregiver? Yes   Is the patient having any side effects they believe may be caused by any medication additions or changes? No   Does the patient have all medications ordered at discharge? Yes   Is the patient taking all medications as directed (includes completed medication regime)? Yes   Does the patient have a primary care provider?  Yes   Does the patient have an appointment with their PCP within 7 days of discharge? Greater than 7 days   Comments regarding PCP TCM [facility] has been sched for 11/10/2021 with PCP Dr Hahn. Pt wanted appt to be next week to give her time to complete antibioitics.    Has the patient kept scheduled appointments due by today? N/A   Has home health visited the patient within 72 hours of discharge? N/A   Psychosocial issues? No   Did the patient receive a copy of their discharge instructions? Yes   Nursing interventions Reviewed instructions with patient   What is the patient's perception of their health status since discharge? Improving   Is the patient/caregiver able to teach back signs and symptoms related to disease process for when to call PCP? Yes   Is the patient/caregiver able to teach back signs and symptoms related to disease process for when to call 911? Yes   Is the patient/caregiver able to teach back the hierarchy of who to call/visit for symptoms/problems? PCP, Specialist, Home health nurse, Urgent Care, ED, 911 Yes   If the patient is a current smoker, are they able to teach back resources for cessation? Not a smoker   TCM call completed? Yes   Wrap up additional comments Pt is feeling better, severe diarrhea improved but not resolved. New  medications (Cipro, Flagyl, Zofran) in place. Pt staying hydrated. No questions right now. Pt dtr is with her. TCM [facility] has been sched for 11/10/2021 with PCP Dr Hahn. Pt wanted appt to be next week to give her time to complete antibioitics.           Whitney Lopez MA    11/1/2021, 16:17 EDT

## 2021-11-02 LAB
BACTERIA SPEC AEROBE CULT: NORMAL
BACTERIA SPEC AEROBE CULT: NORMAL

## 2021-11-04 ENCOUNTER — OFFICE VISIT (OUTPATIENT)
Dept: INTERNAL MEDICINE | Age: 46
End: 2021-11-04

## 2021-11-04 VITALS
DIASTOLIC BLOOD PRESSURE: 66 MMHG | OXYGEN SATURATION: 99 % | SYSTOLIC BLOOD PRESSURE: 110 MMHG | TEMPERATURE: 97.3 F | HEIGHT: 70 IN | BODY MASS INDEX: 19.9 KG/M2 | HEART RATE: 65 BPM | WEIGHT: 139 LBS

## 2021-11-04 DIAGNOSIS — R93.3 ABNORMAL CT SCAN, SMALL BOWEL: ICD-10-CM

## 2021-11-04 DIAGNOSIS — A04.2 ENTEROINVASIVE ESCHERICHIA COLI INFECTION: Primary | ICD-10-CM

## 2021-11-04 PROBLEM — R19.7 DIARRHEA: Status: RESOLVED | Noted: 2021-10-29 | Resolved: 2021-11-04

## 2021-11-04 LAB — BACTERIA SPEC AEROBE CULT: ABNORMAL

## 2021-11-04 PROCEDURE — 99214 OFFICE O/P EST MOD 30 MIN: CPT | Performed by: INTERNAL MEDICINE

## 2021-11-04 NOTE — PROGRESS NOTES
"    I N T E R N A L  M E D I C I N E  J U N O H  K I M,  M D      ENCOUNTER DATE:  11/04/2021    Sonali Lantigua / 46 y.o. / female        CC:   (Transitional Care Follow Up Visit)  Transitional Care- Enteroinvasive Escherichia coli infection (10/31/21-11/1/21)        Within 48 business hours after discharge our office contacted him via telephone to coordinate his care and needs.      I reviewed and discussed the details of that call along with the discharge summary, hospital problems, inpatient lab results, inpatient diagnostic studies, and consultation reports with the patient.     Date of TCM Phone Call 10/31/2021   [facility]   Date of Admission 10/28/2021   Date of Discharge 10/31/2021   Discharge Disposition Home or Self Care       Risk for Readmission (LACE) Score: 3 (10/31/2021  6:00 AM)            VITALS    Visit Vitals  /66 (BP Location: Right arm)   Pulse 65   Temp 97.3 °F (36.3 °C)   Ht 177.8 cm (70\")   Wt 63 kg (139 lb)   LMP 10/17/2021 (Approximate)   SpO2 99%   BMI 19.94 kg/m²       BP Readings from Last 3 Encounters:   11/04/21 110/66   10/31/21 110/70   08/10/21 90/68     Wt Readings from Last 3 Encounters:   11/04/21 63 kg (139 lb)   10/28/21 62.6 kg (138 lb)   08/10/21 61.2 kg (135 lb)      Body mass index is 19.94 kg/m².    HPI:     Date of admission/discharge: As noted above in CC  Hospital: [facility]  Principle Dx: Enterinvasive shigella sonnei infection  Secondary Dx: Abnormal CT appearance of terminal ileum and ileocecal region   History prior to hospitalization: Severe profuse nonbloody diarrhea   Evaluation/Treatment: Stool studies (culture) showed Shigella sonnei infection. Treated with antibiotic. Discharged on Cipro and Flagyl. CT showed abnormal appearance of terminal ileum and ileocecal region suggestive of possible CD. Seen by GI.   Course: Diarrhea resolved. Had right periumbilical/RLQ abdominal pain yesterday but better today. Denies fever, " nausea/vomiting, stool is well formed. Denies family history of IBD.     Patient Care Team:  Lionel Hahn MD as PCP - General (Internal Medicine)  Charisse Waller PA as Physician Assistant (Obstetrics and Gynecology)  ____________________________________________________________________    ASSESSMENT & PLAN:    Problem List Items Addressed This Visit        High    Enteroinvasive Escherichia coli infection - Primary    Current Assessment & Plan     Complete antibiotic course with Cipro and Flagyl.   Resume probiotic therapy.          Relevant Medications    ciprofloxacin (CIPRO) 500 MG tablet       Medium    Abnormal CT scan, small bowel    Current Assessment & Plan     Abnormal appearance of terminal ileum and ileocecal region on CT. If she has recurring abdominal discomfort, change in bowel, blood in stool or constitutional symptoms after completing treatment for Shigella, consider repeat CT or colonoscopy to rule out CD. She has follow-up with me in December.               No orders of the defined types were placed in this encounter.      Summary/Discussion:  •     No follow-ups on file.    ____________________________________________________________________    REVIEW OF SYSTEMS    Review of Systems  No fever, rash, abdominal pain, diarrhea     PHYSICAL EXAMINATION    Physical Exam  No acute distress   Abdomen: Soft and nontender. No palpable mass.   No rash or jaundice   Psych: Normal mood and affect. Normal thought and judgment.     REVIEWED DATA:    Labs:   Lab Results   Component Value Date     10/30/2021    K 3.7 10/30/2021    CALCIUM 7.8 (L) 10/30/2021    AST 20 10/28/2021    ALT 16 10/28/2021    BUN 3 (L) 10/30/2021    CREATININE 0.55 (L) 10/30/2021    CREATININE 0.77 10/28/2021    CREATININE 0.75 01/14/2021    EGFRIFNONA 120 10/30/2021       Lab Results   Component Value Date    WBC 4.80 10/30/2021    HGB 11.2 (L) 10/30/2021    HGB 13.3 10/28/2021    HGB 13.2 01/14/2021     10/30/2021        Lab Results   Component Value Date    GLUCOSEU Negative 10/28/2021    BLOODU Negative 10/28/2021    NITRITEU Negative 10/28/2021    LEUKOCYTESUR Trace (A) 10/28/2021       Imaging:   CT Abdomen Pelvis With Contrast    Result Date: 10/28/2021  Narrative: CT ABDOMEN PELVIS W CONTRAST-  CLINICAL HISTORY: Diarrhea. Low abdominal pain.  TECHNIQUE: Spiral CT images were acquired through the abdomen and pelvis with IV contrast only and were reconstructed in 3 mm thick axial slices.  Radiation dose reduction techniques were utilized, including automated exposure control and exposure modulation based on body size.  COMPARISON: None  FINDINGS: The liver, spleen, pancreas, kidneys, and adrenal glands appear within normal limits. The colon is contracted and contains only liquid stool and air. There is mild diffuse mucosal hyperenhancement throughout the colon. The findings are consistent with moderate diffuse colitis. In addition, there is moderate circumferential wall edema and mucosal hyperenhancement involving the terminal ileum extending to the ileocecal junction. This raises the possibility of Crohn's disease as the etiology for these findings. The remainder the small bowel appears within normal limits. There is no bowel distention. There is no lymphadenopathy within the abdomen or pelvis. The uterus is mildly enlarged. Measures 9.9 x 5.2 x 7.5 cm in diameter. A small leiomyoma is noted in the uterine fundus. There is a small amount of free fluid in the pelvis.      Impression: CT findings consistent with moderate diffuse colitis as described. There is also inflammatory change involving the terminal ileum raising the possibility of Crohn's disease as the etiology.  This report was finalized on 10/28/2021 2:26 PM by Dr. Rodriguez Villasenor M.D.         Medical Tests:        Summary of old records / correspondence / consultant report:   DC summary re: issues addressed on HPI and Consultation notes: GI re: colitis      Request outside records:         MEDICATIONS   Current Outpatient Medications   Medication Sig Dispense Refill   • ascorbic acid (VITAMIN C) 1000 MG tablet      • Cholecalciferol (Vitamin D3) 50 MCG (2000 UT) tablet      • ciprofloxacin (CIPRO) 500 MG tablet Take 1 tablet by mouth Every 12 (Twelve) Hours for 14 doses. Indications: Infection Within the Abdomen 14 tablet 0   • metroNIDAZOLE (FLAGYL) 500 MG tablet Take 1 tablet by mouth Every 8 (Eight) Hours for 21 doses. Indications: Infection Within the Abdomen 21 tablet 0   • montelukast (SINGULAIR) 10 MG tablet Take 10 mg by mouth every night at bedtime.     • ondansetron ODT (Zofran ODT) 4 MG disintegrating tablet Place 1 tablet on the tongue Every 8 (Eight) Hours As Needed for Nausea or Vomiting. 10 tablet 0   • Tri-Sprintec 0.18/0.215/0.25 MG-35 MCG per tablet Take 1 tablet by mouth Daily.     • Zinc Sulfate (ZINC 15 PO) Take  by mouth. Take 50 mg daily       No current facility-administered medications for this visit.       Current outpatient and discharge medications have been reconciled for the patient.  Reviewed by: Lionel Hahn MD         Examiner was wearing KN95 mask and exam gloves during the entire duration of the visit. Patient was masked the entire time.   Minimum social distance of 6 ft maintained entire visit except if physical contact was necessary as documented.     **Dragon Disclaimer:   Much of this encounter note is an electronic transcription/translation of spoken language to printed text. The electronic translation of spoken language may permit erroneous, or at times, nonsensical words or phrases to be inadvertently transcribed. Although I have reviewed the note for such errors, some may still exist.

## 2021-11-04 NOTE — ASSESSMENT & PLAN NOTE
Abnormal appearance of terminal ileum and ileocecal region on CT. If she has recurring abdominal discomfort, change in bowel, blood in stool or constitutional symptoms after completing treatment for Shigella, consider repeat CT or colonoscopy to rule out CD. She has follow-up with me in December.

## 2021-12-17 ENCOUNTER — APPOINTMENT (OUTPATIENT)
Dept: WOMENS IMAGING | Facility: HOSPITAL | Age: 46
End: 2021-12-17

## 2021-12-17 PROCEDURE — 77067 SCR MAMMO BI INCL CAD: CPT | Performed by: RADIOLOGY

## 2021-12-17 PROCEDURE — 77063 BREAST TOMOSYNTHESIS BI: CPT | Performed by: RADIOLOGY

## 2021-12-22 ENCOUNTER — APPOINTMENT (OUTPATIENT)
Dept: VACCINE CLINIC | Facility: HOSPITAL | Age: 46
End: 2021-12-22

## 2022-04-08 DIAGNOSIS — F51.02 ADJUSTMENT INSOMNIA: ICD-10-CM

## 2022-04-11 RX ORDER — TRAZODONE HYDROCHLORIDE 50 MG/1
50 TABLET ORAL NIGHTLY PRN
Qty: 30 TABLET | Refills: 5 | OUTPATIENT
Start: 2022-04-11

## 2022-08-11 ENCOUNTER — OFFICE VISIT (OUTPATIENT)
Dept: INTERNAL MEDICINE | Age: 47
End: 2022-08-11

## 2022-08-11 ENCOUNTER — LAB (OUTPATIENT)
Dept: LAB | Facility: HOSPITAL | Age: 47
End: 2022-08-11

## 2022-08-11 VITALS
TEMPERATURE: 97.1 F | OXYGEN SATURATION: 99 % | HEART RATE: 88 BPM | DIASTOLIC BLOOD PRESSURE: 70 MMHG | WEIGHT: 139.2 LBS | BODY MASS INDEX: 19.93 KG/M2 | SYSTOLIC BLOOD PRESSURE: 106 MMHG | HEIGHT: 70 IN

## 2022-08-11 DIAGNOSIS — Z87.19 HISTORY OF COLITIS: Primary | ICD-10-CM

## 2022-08-11 DIAGNOSIS — R53.83 FATIGUE, UNSPECIFIED TYPE: ICD-10-CM

## 2022-08-11 DIAGNOSIS — D25.9 UTERINE LEIOMYOMA, UNSPECIFIED LOCATION: ICD-10-CM

## 2022-08-11 DIAGNOSIS — N92.6 IRREGULAR MENSES: ICD-10-CM

## 2022-08-11 DIAGNOSIS — R10.9 ABDOMINAL CRAMPING: ICD-10-CM

## 2022-08-11 LAB
25(OH)D3 SERPL-MCNC: 81.5 NG/ML (ref 30–100)
ALBUMIN SERPL-MCNC: 4 G/DL (ref 3.5–5.2)
ALBUMIN/GLOB SERPL: 1.7 G/DL
ALP SERPL-CCNC: 17 U/L (ref 39–117)
ALT SERPL W P-5'-P-CCNC: 16 U/L (ref 1–33)
ANION GAP SERPL CALCULATED.3IONS-SCNC: 12.5 MMOL/L (ref 5–15)
AST SERPL-CCNC: 16 U/L (ref 1–32)
BACTERIA UR QL AUTO: ABNORMAL /HPF
BASOPHILS # BLD AUTO: 0.03 10*3/MM3 (ref 0–0.2)
BASOPHILS NFR BLD AUTO: 0.5 % (ref 0–1.5)
BILIRUB SERPL-MCNC: 0.2 MG/DL (ref 0–1.2)
BILIRUB UR QL STRIP: NEGATIVE
BUN SERPL-MCNC: 9 MG/DL (ref 6–20)
BUN/CREAT SERPL: 10.7 (ref 7–25)
CALCIUM SPEC-SCNC: 8.9 MG/DL (ref 8.6–10.5)
CHLORIDE SERPL-SCNC: 104 MMOL/L (ref 98–107)
CLARITY UR: ABNORMAL
CO2 SERPL-SCNC: 23.5 MMOL/L (ref 22–29)
COLOR UR: YELLOW
CREAT SERPL-MCNC: 0.84 MG/DL (ref 0.57–1)
DEPRECATED RDW RBC AUTO: 38.9 FL (ref 37–54)
EGFRCR SERPLBLD CKD-EPI 2021: 86.9 ML/MIN/1.73
EOSINOPHIL # BLD AUTO: 0.11 10*3/MM3 (ref 0–0.4)
EOSINOPHIL NFR BLD AUTO: 1.8 % (ref 0.3–6.2)
ERYTHROCYTE [DISTWIDTH] IN BLOOD BY AUTOMATED COUNT: 11.4 % (ref 12.3–15.4)
FERRITIN SERPL-MCNC: 38.2 NG/ML (ref 13–150)
FSH SERPL-ACNC: 0.82 MIU/ML
GLOBULIN UR ELPH-MCNC: 2.4 GM/DL
GLUCOSE SERPL-MCNC: 97 MG/DL (ref 65–99)
GLUCOSE UR STRIP-MCNC: NEGATIVE MG/DL
HCT VFR BLD AUTO: 37.6 % (ref 34–46.6)
HGB BLD-MCNC: 13 G/DL (ref 12–15.9)
HGB UR QL STRIP.AUTO: NEGATIVE
HYALINE CASTS UR QL AUTO: ABNORMAL /LPF
IMM GRANULOCYTES # BLD AUTO: 0.01 10*3/MM3 (ref 0–0.05)
IMM GRANULOCYTES NFR BLD AUTO: 0.2 % (ref 0–0.5)
IRON 24H UR-MRATE: 128 MCG/DL (ref 37–145)
IRON SATN MFR SERPL: 27 % (ref 20–50)
KETONES UR QL STRIP: NEGATIVE
LEUKOCYTE ESTERASE UR QL STRIP.AUTO: ABNORMAL
LH SERPL-ACNC: 0.46 MIU/ML
LYMPHOCYTES # BLD AUTO: 1.97 10*3/MM3 (ref 0.7–3.1)
LYMPHOCYTES NFR BLD AUTO: 32.8 % (ref 19.6–45.3)
MCH RBC QN AUTO: 32.6 PG (ref 26.6–33)
MCHC RBC AUTO-ENTMCNC: 34.6 G/DL (ref 31.5–35.7)
MCV RBC AUTO: 94.2 FL (ref 79–97)
MONOCYTES # BLD AUTO: 0.52 10*3/MM3 (ref 0.1–0.9)
MONOCYTES NFR BLD AUTO: 8.7 % (ref 5–12)
NEUTROPHILS NFR BLD AUTO: 3.37 10*3/MM3 (ref 1.7–7)
NEUTROPHILS NFR BLD AUTO: 56 % (ref 42.7–76)
NITRITE UR QL STRIP: NEGATIVE
NRBC BLD AUTO-RTO: 0 /100 WBC (ref 0–0.2)
PH UR STRIP.AUTO: 8 [PH] (ref 5–8)
PLATELET # BLD AUTO: 238 10*3/MM3 (ref 140–450)
PMV BLD AUTO: 10.9 FL (ref 6–12)
POTASSIUM SERPL-SCNC: 4.1 MMOL/L (ref 3.5–5.2)
PROLACTIN SERPL-MCNC: 10.4 NG/ML (ref 4.79–23.3)
PROT SERPL-MCNC: 6.4 G/DL (ref 6–8.5)
PROT UR QL STRIP: NEGATIVE
RBC # BLD AUTO: 3.99 10*6/MM3 (ref 3.77–5.28)
RBC # UR STRIP: ABNORMAL /HPF
REF LAB TEST METHOD: ABNORMAL
SODIUM SERPL-SCNC: 140 MMOL/L (ref 136–145)
SP GR UR STRIP: 1.02 (ref 1–1.03)
SQUAMOUS #/AREA URNS HPF: ABNORMAL /HPF
T4 FREE SERPL-MCNC: 0.81 NG/DL (ref 0.93–1.7)
TIBC SERPL-MCNC: 472 MCG/DL (ref 298–536)
TRANSFERRIN SERPL-MCNC: 317 MG/DL (ref 200–360)
TSH SERPL DL<=0.05 MIU/L-ACNC: 1.03 UIU/ML (ref 0.27–4.2)
UROBILINOGEN UR QL STRIP: ABNORMAL
VIT B12 BLD-MCNC: 304 PG/ML (ref 211–946)
WBC # UR STRIP: ABNORMAL /HPF
WBC NRBC COR # BLD: 6.01 10*3/MM3 (ref 3.4–10.8)

## 2022-08-11 PROCEDURE — 82672 ASSAY OF ESTROGEN: CPT | Performed by: NURSE PRACTITIONER

## 2022-08-11 PROCEDURE — 84146 ASSAY OF PROLACTIN: CPT | Performed by: NURSE PRACTITIONER

## 2022-08-11 PROCEDURE — 99214 OFFICE O/P EST MOD 30 MIN: CPT | Performed by: NURSE PRACTITIONER

## 2022-08-11 PROCEDURE — 83001 ASSAY OF GONADOTROPIN (FSH): CPT | Performed by: NURSE PRACTITIONER

## 2022-08-11 PROCEDURE — 84443 ASSAY THYROID STIM HORMONE: CPT | Performed by: NURSE PRACTITIONER

## 2022-08-11 PROCEDURE — 82607 VITAMIN B-12: CPT | Performed by: NURSE PRACTITIONER

## 2022-08-11 PROCEDURE — 84402 ASSAY OF FREE TESTOSTERONE: CPT | Performed by: NURSE PRACTITIONER

## 2022-08-11 PROCEDURE — 82728 ASSAY OF FERRITIN: CPT | Performed by: NURSE PRACTITIONER

## 2022-08-11 PROCEDURE — 87086 URINE CULTURE/COLONY COUNT: CPT | Performed by: NURSE PRACTITIONER

## 2022-08-11 PROCEDURE — 81001 URINALYSIS AUTO W/SCOPE: CPT | Performed by: NURSE PRACTITIONER

## 2022-08-11 PROCEDURE — 83002 ASSAY OF GONADOTROPIN (LH): CPT | Performed by: NURSE PRACTITIONER

## 2022-08-11 PROCEDURE — 36415 COLL VENOUS BLD VENIPUNCTURE: CPT | Performed by: NURSE PRACTITIONER

## 2022-08-11 PROCEDURE — 84439 ASSAY OF FREE THYROXINE: CPT | Performed by: NURSE PRACTITIONER

## 2022-08-11 PROCEDURE — 82306 VITAMIN D 25 HYDROXY: CPT | Performed by: NURSE PRACTITIONER

## 2022-08-11 PROCEDURE — 83540 ASSAY OF IRON: CPT | Performed by: NURSE PRACTITIONER

## 2022-08-11 PROCEDURE — 84466 ASSAY OF TRANSFERRIN: CPT | Performed by: NURSE PRACTITIONER

## 2022-08-11 PROCEDURE — 85025 COMPLETE CBC W/AUTO DIFF WBC: CPT | Performed by: NURSE PRACTITIONER

## 2022-08-11 PROCEDURE — 80053 COMPREHEN METABOLIC PANEL: CPT | Performed by: NURSE PRACTITIONER

## 2022-08-11 PROCEDURE — 82627 DEHYDROEPIANDROSTERONE: CPT | Performed by: NURSE PRACTITIONER

## 2022-08-11 RX ORDER — FLUTICASONE PROPIONATE AND SALMETEROL 250; 50 UG/1; UG/1
POWDER RESPIRATORY (INHALATION)
COMMUNITY
Start: 2022-07-16

## 2022-08-11 RX ORDER — GLUCOSAMINE/D3/BOSWELLIA SERRA 1500MG-400
TABLET ORAL
COMMUNITY

## 2022-08-11 RX ORDER — LACTOBACILLUS RHAMNOSUS GG 10B CELL
CAPSULE ORAL
COMMUNITY
Start: 2021-10-11

## 2022-08-11 NOTE — PROGRESS NOTES
I N T E R N A L  M E D I C I N E  DAVY JUAN MANUELASIA      ENCOUNTER DATE:  08/11/2022    Sonali Lantigua / 47 y.o. / female      CHIEF COMPLAINT / REASON FOR OFFICE VISIT     Fatigue and Vaginal Bleeding (Has seen GYN)      ASSESSMENT & PLAN     1. History of colitis  - Ambulatory Referral to Gastroenterology    2. Abdominal cramping  - Ambulatory Referral to Gastroenterology  - Ambulatory Referral to Obstetrics / Gynecology  - Urinalysis With Culture If Indicated - Urine, Clean Catch  - US Pelvis Transvaginal Non OB; Future  - US testicular or ovarian vascular limited; Future    3. Fatigue, unspecified type  - Ambulatory Referral to Obstetrics / Gynecology  - CBC & Differential  - Comprehensive Metabolic Panel  - TSH+Free T4  - Vitamin D 25 Hydroxy  - Urinalysis With Culture If Indicated - Urine, Clean Catch  - Vitamin B12  - Iron and TIBC  - Ferritin  - US testicular or ovarian vascular limited; Future    4. Irregular menses  - Ambulatory Referral to Obstetrics / Gynecology  - Estrogens, Total  - Follicle stimulating hormone  - Luteinizing hormone  - Prolactin  - Testosterone Free Direct  - DHEA-sulfate  - US Pelvis Transvaginal Non OB; Future  - US testicular or ovarian vascular limited; Future    5. Uterine leiomyoma, unspecified location  - Ambulatory Referral to Obstetrics / Gynecology  - US Pelvis Transvaginal Non OB; Future  - US testicular or ovarian vascular limited; Future    Orders Placed This Encounter   Procedures   • US Pelvis Transvaginal Non OB   • US testicular or ovarian vascular limited   • Comprehensive Metabolic Panel   • TSH+Free T4   • Vitamin D 25 Hydroxy   • Urinalysis With Culture If Indicated - Urine, Clean Catch   • Vitamin B12   • Iron and TIBC   • Ferritin   • Estrogens, Total   • Follicle stimulating hormone   • Luteinizing hormone   • Prolactin   • Testosterone Free Direct   • DHEA-sulfate   • Ambulatory Referral to Gastroenterology   • Ambulatory Referral to Obstetrics / Gynecology  "  • CBC & Differential     No orders of the defined types were placed in this encounter.      SUMMARY/DISCUSSION  • Recommend routine follow-up in 3 months, follow-up earlier depending on work-up today, worsening symptoms      Next Appointment with me: Visit date not found    Return in about 3 months (around 11/11/2022) for Next scheduled follow up.      VITAL SIGNS     Visit Vitals  /70 (Cuff Size: Adult)   Pulse 88   Temp 97.1 °F (36.2 °C) (Temporal)   Ht 177.8 cm (70\")   Wt 63.1 kg (139 lb 3.2 oz)   LMP 07/17/2022 (Exact Date)   SpO2 99%   Breastfeeding No   BMI 19.97 kg/m²     Wt Readings from Last 3 Encounters:   08/11/22 63.1 kg (139 lb 3.2 oz)   11/04/21 63 kg (139 lb)   10/28/21 62.6 kg (138 lb)     Body mass index is 19.97 kg/m².      MEDICATIONS AT THE TIME OF OFFICE VISIT     Current Outpatient Medications on File Prior to Visit   Medication Sig   • Biotin 25080 MCG tablet    • Cholecalciferol (Vitamin D3) 50 MCG (2000 UT) tablet    • montelukast (SINGULAIR) 10 MG tablet Take 10 mg by mouth every night at bedtime.   • Multiple Vitamins-Minerals (WOMENS MULTI GUMMIES PO)    • Probiotic Product (Culturelle Probiotics) chewable tablet    • Tri-Sprintec 0.18/0.215/0.25 MG-35 MCG per tablet Take 1 tablet by mouth Daily.   • Wixela Inhub 250-50 MCG/ACT DISKUS INHALE 1 PUFF BY MOUTH EVERY 12 HOURS. RINSE MOUTH AFTER USE   • [DISCONTINUED] ascorbic acid (VITAMIN C) 1000 MG tablet    • [DISCONTINUED] ondansetron ODT (Zofran ODT) 4 MG disintegrating tablet Place 1 tablet on the tongue Every 8 (Eight) Hours As Needed for Nausea or Vomiting.   • [DISCONTINUED] Zinc Sulfate (ZINC 15 PO) Take  by mouth. Take 50 mg daily     No current facility-administered medications on file prior to visit.         HISTORY OF PRESENT ILLNESS     History of enteroinvasive Shigella in October, treated during hospitalization, recommended outpatient colonoscopy in 6 months, patient has not completed as of yet.  Today she is " complaining of worsening abdominal cramping, irregular menses over the last 2 weeks.  Today she states is much better today than the last few days with symptoms overall mild to none.  She is having no diarrhea, blood in the stools or dark stools.  No nausea, vomiting.  Symptoms are more so correlate with gynecological findings.  She was seen women's first, but is seeking a second opinion.  Uterine fibroids seen, D&C completed in December which did improve her symptoms for approximately 4 months, but these have returned with irregular menses even with birth control.  On last imaging uterus is mildly enlarged, a small leiomyoma noted in the uterine fundus with small amount of free fluid.  Abdominal cramping ranges from the periumbilical to pelvic region.  No significant tenderness or pain.  No fever, chills or recent illness.  Complaint of increasing fatigue over the last few months.    REVIEW OF SYSTEMS     Constitutional fatigue   Resp neg  CV neg   irregular menses, pelvic pain   GI abdominal cramping    PHYSICAL EXAMINATION     Physical Exam  Constitutional  No distress  Cardiovascular Rate  normal . Rhythm: regular . Heart sounds:  normal  Pulmonary/Chest  Effort normal. Breath sounds:  normal  GI negative tenderness, distention   negative pelvic pain  Psychiatric  Alert. Judgment and thought content normal. Mood normal     REVIEWED DATA     Labs:         Imaging:           Medical Tests:             Summary of old records / correspondence / consultant report:           Request outside records:           *Examiner was wearing medical surgical mask, face shield and exam gloves during the entire duration of the visit. Patient was masked the entire time.   Minimum social distance of 6 ft maintained entire visit except if physical contact was necessary as documented.     Dictated utilizing Dragon dictation

## 2022-08-12 DIAGNOSIS — R82.998 URINE LEUKOCYTES: Primary | ICD-10-CM

## 2022-08-12 LAB
BACTERIA SPEC AEROBE CULT: NO GROWTH
DHEA-S SERPL-MCNC: 11 UG/DL (ref 71.6–375.4)

## 2022-08-12 RX ORDER — NITROFURANTOIN 25; 75 MG/1; MG/1
100 CAPSULE ORAL 2 TIMES DAILY
Qty: 10 CAPSULE | Refills: 0 | Status: SHIPPED | OUTPATIENT
Start: 2022-08-12 | End: 2022-08-17

## 2022-08-14 LAB — ESTROGEN SERPL-MCNC: 54 PG/ML (ref 56–213)

## 2022-08-15 DIAGNOSIS — E34.9 HORMONE IMBALANCE: Primary | ICD-10-CM

## 2022-08-18 LAB — TESTOST FREE SERPL-MCNC: <0.2 PG/ML (ref 6.8–21.5)

## 2022-08-19 ENCOUNTER — APPOINTMENT (OUTPATIENT)
Dept: ULTRASOUND IMAGING | Facility: HOSPITAL | Age: 47
End: 2022-08-19

## 2022-08-19 ENCOUNTER — HOSPITAL ENCOUNTER (OUTPATIENT)
Dept: ULTRASOUND IMAGING | Facility: HOSPITAL | Age: 47
Discharge: HOME | End: 2022-08-19
Admitting: NURSE PRACTITIONER

## 2022-08-19 DIAGNOSIS — N92.6 IRREGULAR MENSES: ICD-10-CM

## 2022-08-19 DIAGNOSIS — R53.83 FATIGUE, UNSPECIFIED TYPE: ICD-10-CM

## 2022-08-19 DIAGNOSIS — D25.9 UTERINE LEIOMYOMA, UNSPECIFIED LOCATION: ICD-10-CM

## 2022-08-19 DIAGNOSIS — R10.9 ABDOMINAL CRAMPING: ICD-10-CM

## 2022-08-19 PROCEDURE — 93976 VASCULAR STUDY: CPT

## 2022-08-19 PROCEDURE — 76830 TRANSVAGINAL US NON-OB: CPT

## 2022-08-19 PROCEDURE — 76856 US EXAM PELVIC COMPLETE: CPT

## 2022-08-23 ENCOUNTER — OFFICE VISIT (OUTPATIENT)
Dept: GASTROENTEROLOGY | Facility: CLINIC | Age: 47
End: 2022-08-23

## 2022-08-23 ENCOUNTER — TELEPHONE (OUTPATIENT)
Dept: GASTROENTEROLOGY | Facility: CLINIC | Age: 47
End: 2022-08-23

## 2022-08-23 VITALS
HEIGHT: 71 IN | TEMPERATURE: 97.3 F | WEIGHT: 138 LBS | DIASTOLIC BLOOD PRESSURE: 68 MMHG | BODY MASS INDEX: 19.32 KG/M2 | SYSTOLIC BLOOD PRESSURE: 101 MMHG | HEART RATE: 66 BPM

## 2022-08-23 DIAGNOSIS — R10.9 ABDOMINAL PAIN, UNSPECIFIED ABDOMINAL LOCATION: Primary | ICD-10-CM

## 2022-08-23 PROCEDURE — 99214 OFFICE O/P EST MOD 30 MIN: CPT | Performed by: INTERNAL MEDICINE

## 2022-08-23 RX ORDER — SODIUM CHLORIDE, SODIUM LACTATE, POTASSIUM CHLORIDE, CALCIUM CHLORIDE 600; 310; 30; 20 MG/100ML; MG/100ML; MG/100ML; MG/100ML
30 INJECTION, SOLUTION INTRAVENOUS CONTINUOUS
Status: CANCELLED | OUTPATIENT
Start: 2022-09-06

## 2022-08-23 NOTE — PROGRESS NOTES
Chief Complaint   Patient presents with   • Abdominal Pain   • Fatigue     Subjective     HPI  Sonali Lantigua is a 47 y.o. female who presents today for office follow up.  This patient was seen by her service the latter part of last year when she was hospitalized for diarrhea.  She was found to have evidence of diffuse colitis on CT scan and stool studies showed evidence of enteroinvasive E. coli.  She was treated with antibiotics recommended to have a follow-up outpatient colonoscopy after discharge.  She is done well from a GI standpoint with resolution of her GI symptoms.  She is undergoing work-up through her gynecologist for some pelvic pain and fatigue.  She had a D&C in December which provided some brief improvement in sx.   I did review recent blood work done earlier this month which was generally unremarkable.    Objective   Vitals:    08/23/22 1328   BP: 101/68   Pulse: 66   Temp: 97.3 °F (36.3 °C)       Physical Exam  Vitals reviewed.   Constitutional:       Appearance: She is well-developed.   HENT:      Head: Normocephalic and atraumatic.   Neurological:      Mental Status: She is alert and oriented to person, place, and time.   Psychiatric:         Behavior: Behavior normal.         Thought Content: Thought content normal.         Judgment: Judgment normal.       The following data was reviewed by: Geo Mendoza MD on 08/23/2022:  Common labs    Common Labsle 10/28/21 10/28/21 10/30/21 10/30/21 8/11/22 8/11/22    1215 1215 0400 0400 1644 1644   Glucose 92   127 (A)  97   BUN 9   3 (A)  9   Creatinine 0.77   0.55 (A)  0.84   eGFR Non African Am 81   120     Sodium 129 (A)   140  140   Potassium 3.5   3.7  4.1   Chloride 101   107  104   Calcium 8.4 (A)   7.8 (A)  8.9   Albumin 3.40 (A)     4.00   Total Bilirubin 0.5     0.2   Alkaline Phosphatase 15 (A)     17 (A)   AST (SGOT) 20     16   ALT (SGPT) 16     16   WBC  6.16 4.80  6.01    Hemoglobin  13.3 11.2 (A)  13.0    Hematocrit  39.9 32.9 (A)   37.6    Platelets  164 162  238    (A) Abnormal value            Data reviewed: Radiologic studies CT A/P from 2021 reviewed     Assessment & Plan   Assessment:     1. Abdominal pain, unspecified abdominal location    2.      Colitis    Plan:   Recommend we proceed with colonoscopy given the patient's episode of colitis last year although the etiology was felt to be infectious given her positive stool studies.  She also having some vague lower abdominal pain and cramping which is most likely gynecological in nature but will rule out any residual inflammatory process with colonoscopy.  Case request has been submitted.          Geo Mendoza M.D.  [facility]  [address] [place] [address]  Office: [phone]

## 2022-08-23 NOTE — TELEPHONE ENCOUNTER
HANG patient via telephone for. Scheduled 09/06/2022 with arrival time of 12:000pm . Prep paperwork mailed to verified address on file. Patient advised arrival time may change based on [facility] guidelines. HANG SANTOS

## 2022-09-06 ENCOUNTER — HOSPITAL ENCOUNTER (OUTPATIENT)
Facility: HOSPITAL | Age: 47
Setting detail: HOSPITAL OUTPATIENT SURGERY
Discharge: HOME | End: 2022-09-06
Attending: INTERNAL MEDICINE | Admitting: INTERNAL MEDICINE

## 2022-09-06 ENCOUNTER — ANESTHESIA (OUTPATIENT)
Dept: GASTROENTEROLOGY | Facility: HOSPITAL | Age: 47
End: 2022-09-06

## 2022-09-06 ENCOUNTER — ANESTHESIA EVENT (OUTPATIENT)
Dept: GASTROENTEROLOGY | Facility: HOSPITAL | Age: 47
End: 2022-09-06

## 2022-09-06 VITALS
HEIGHT: 71 IN | DIASTOLIC BLOOD PRESSURE: 85 MMHG | OXYGEN SATURATION: 100 % | HEART RATE: 65 BPM | SYSTOLIC BLOOD PRESSURE: 111 MMHG | RESPIRATION RATE: 16 BRPM | WEIGHT: 135 LBS | BODY MASS INDEX: 18.9 KG/M2

## 2022-09-06 DIAGNOSIS — R10.9 ABDOMINAL PAIN, UNSPECIFIED ABDOMINAL LOCATION: ICD-10-CM

## 2022-09-06 LAB
B-HCG UR QL: NEGATIVE
EXPIRATION DATE: NORMAL
INTERNAL NEGATIVE CONTROL: NEGATIVE
INTERNAL POSITIVE CONTROL: POSITIVE
Lab: NORMAL

## 2022-09-06 PROCEDURE — 81025 URINE PREGNANCY TEST: CPT | Performed by: INTERNAL MEDICINE

## 2022-09-06 PROCEDURE — 45378 DIAGNOSTIC COLONOSCOPY: CPT | Performed by: INTERNAL MEDICINE

## 2022-09-06 PROCEDURE — 25010000002 PROPOFOL 10 MG/ML EMULSION: Performed by: NURSE ANESTHETIST, CERTIFIED REGISTERED

## 2022-09-06 RX ORDER — PROPOFOL 10 MG/ML
VIAL (ML) INTRAVENOUS CONTINUOUS PRN
Status: DISCONTINUED | OUTPATIENT
Start: 2022-09-06 | End: 2022-09-06 | Stop reason: SURG

## 2022-09-06 RX ORDER — LIDOCAINE HYDROCHLORIDE 20 MG/ML
INJECTION, SOLUTION INFILTRATION; PERINEURAL AS NEEDED
Status: DISCONTINUED | OUTPATIENT
Start: 2022-09-06 | End: 2022-09-06 | Stop reason: SURG

## 2022-09-06 RX ORDER — PROPOFOL 10 MG/ML
VIAL (ML) INTRAVENOUS AS NEEDED
Status: DISCONTINUED | OUTPATIENT
Start: 2022-09-06 | End: 2022-09-06 | Stop reason: SURG

## 2022-09-06 RX ORDER — SODIUM CHLORIDE, SODIUM LACTATE, POTASSIUM CHLORIDE, CALCIUM CHLORIDE 600; 310; 30; 20 MG/100ML; MG/100ML; MG/100ML; MG/100ML
30 INJECTION, SOLUTION INTRAVENOUS CONTINUOUS
Status: DISCONTINUED | OUTPATIENT
Start: 2022-09-06 | End: 2022-09-06 | Stop reason: HOSPADM

## 2022-09-06 RX ADMIN — LIDOCAINE HYDROCHLORIDE 60 MG: 20 INJECTION, SOLUTION INFILTRATION; PERINEURAL at 12:48

## 2022-09-06 RX ADMIN — Medication 50 MG: at 12:48

## 2022-09-06 RX ADMIN — SODIUM CHLORIDE, POTASSIUM CHLORIDE, SODIUM LACTATE AND CALCIUM CHLORIDE 30 ML/HR: 600; 310; 30; 20 INJECTION, SOLUTION INTRAVENOUS at 12:42

## 2022-09-06 RX ADMIN — PROPOFOL 300 MCG/KG/MIN: 10 INJECTION, EMULSION INTRAVENOUS at 12:48

## 2022-09-06 NOTE — DISCHARGE INSTRUCTIONS
For the next 24 hours patient needs to be with a responsible adult.    For 24 hours DO NOT drive, operate machinery, appliances, drink alcohol, make important decisions or sign legal documents.    Start with a light or bland diet if you are feeling sick to your stomach otherwise advance to regular diet as tolerated.    Follow recommendations on procedure report if provided by your doctor.    Call Dr Mendoza for problems [phone]    Problems may include but not limited to: large amounts of bleeding, trouble breathing, repeated vomiting, severe unrelieved pain, fever or chills.

## 2022-09-06 NOTE — ANESTHESIA PREPROCEDURE EVALUATION
Anesthesia Evaluation     Patient summary reviewed and Nursing notes reviewed   no history of anesthetic complications:  NPO Solid Status: > 8 hours  NPO Liquid Status: > 2 hours           Airway   Mallampati: II  TM distance: >3 FB  Neck ROM: full  Dental      Pulmonary    (+) asthma,  Cardiovascular         Neuro/Psych  GI/Hepatic/Renal/Endo      Musculoskeletal     Abdominal    Substance History      OB/GYN          Other                        Anesthesia Plan    ASA 2     MAC     intravenous induction     Anesthetic plan, risks, benefits, and alternatives have been provided, discussed and informed consent has been obtained with: patient.        CODE STATUS:

## 2022-09-06 NOTE — ANESTHESIA POSTPROCEDURE EVALUATION
Patient: Sonali Lantigua    Procedure Summary     Date: 09/06/22 Room / Location:  [facility] /  [facility]    Anesthesia Start: 1244 Anesthesia Stop: 1311    Procedure: COLONOSCOPY into cecum (N/A ) Diagnosis:       Abdominal pain, unspecified abdominal location      (Abdominal pain, unspecified abdominal location [R10.9])    Surgeons: Geo Mendoza MD Provider: Marshall Mack MD    Anesthesia Type: MAC ASA Status: 2          Anesthesia Type: MAC    Vitals  Vitals Value Taken Time   /80 09/06/22 1319   Temp     Pulse 72 09/06/22 1319   Resp 16 09/06/22 1319   SpO2 99 % 09/06/22 1319           Post Anesthesia Care and Evaluation    Patient location during evaluation: bedside  Patient participation: complete - patient participated  Level of consciousness: awake and alert  Pain management: adequate    Airway patency: patent  Anesthetic complications: No anesthetic complications  PONV Status: controlled  Cardiovascular status: blood pressure returned to baseline and acceptable  Respiratory status: acceptable  Hydration status: acceptable

## 2022-10-12 ENCOUNTER — OFFICE VISIT (OUTPATIENT)
Dept: OBSTETRICS AND GYNECOLOGY | Facility: CLINIC | Age: 47
End: 2022-10-12

## 2022-10-12 VITALS
SYSTOLIC BLOOD PRESSURE: 124 MMHG | DIASTOLIC BLOOD PRESSURE: 86 MMHG | HEART RATE: 83 BPM | BODY MASS INDEX: 19.54 KG/M2 | HEIGHT: 71 IN | WEIGHT: 139.6 LBS

## 2022-10-12 DIAGNOSIS — R30.0 DYSURIA: ICD-10-CM

## 2022-10-12 DIAGNOSIS — E34.9 HORMONE IMBALANCE: Primary | ICD-10-CM

## 2022-10-12 PROCEDURE — 99203 OFFICE O/P NEW LOW 30 MIN: CPT | Performed by: OBSTETRICS & GYNECOLOGY

## 2022-10-12 PROCEDURE — 81002 URINALYSIS NONAUTO W/O SCOPE: CPT | Performed by: OBSTETRICS & GYNECOLOGY

## 2022-10-12 NOTE — PROGRESS NOTES
"Chief Complaint  Consult- Pt referred from PCP. Pt wanting to talk about birth control and c/o of pain with urination and frequency    Subjective        Sonali Lantigua presents to [facility]  History of Present Illness  Patient is a 47-year-old -0-1-1 who presents as a new patient.  She has previously received gynecological care at [facility].  She states she is up-to-date on her screening Pap smear and mammogram.  She has been referred by her primary care provider due to low FSH, LH, and estradiol.  She has recently been feeling very fatigued and not like herself and is also seeing an endocrinologist due to her low hormone labs.  She is on Tri-Sprintec and states she takes this due to history of irregular cycles.  She states that before she got pregnant, she would go months between menstrual cycles.  She states she is currently on her menstrual period. she states she is not currently sexually active.  She is scheduled for an [facility] gland on [facility].  She is here today to ask for recommendations on whether she should stop her birth control pills.    She also complains of urinary frequency and dysuria.  Objective   Vital Signs:  /86   Pulse 83   Ht 180.3 cm (71\")   Wt 63.3 kg (139 lb 9.6 oz)   BMI 19.47 kg/m²   Estimated body mass index is 19.47 kg/m² as calculated from the following:    Height as of this encounter: 180.3 cm (71\").    Weight as of this encounter: 63.3 kg (139 lb 9.6 oz).    BMI is within normal parameters. No other follow-up for BMI required.      Physical Exam  Vitals reviewed.   Constitutional:       Appearance: Normal appearance.   Neurological:      General: No focal deficit present.      Mental Status: She is alert. Mental status is at baseline.   Psychiatric:         Mood and Affect: Mood normal.         Behavior: Behavior normal.        Result Review :                Assessment and Plan "   Diagnoses and all orders for this visit:    1. Hormone imbalance (Primary)  -     Follicle Stimulating Hormone  -     Luteinizing Hormone  -     Estradiol    2. Dysuria  -     Urine Culture - Urine, Urine, Clean Catch      I discussed with the patient my recommendations to stop her oral contraceptive pill and repeat labs in 1 month.  She may have those repeated in our office and I will call her with results and further plan of care.  I have also advised that she have her MRI sent to our office.  Urinalysis was done in the office and only showed small blood.  Urine culture has been sent.  She will sign a record of release today for her previous gynecological records.       Follow Up   No follow-ups on file.  Patient was given instructions and counseling regarding her condition or for health maintenance advice. Please see specific information pulled into the AVS if appropriate.

## 2022-10-13 LAB
BILIRUB BLD-MCNC: NEGATIVE MG/DL
CLARITY, POC: ABNORMAL
COLOR UR: YELLOW
GLUCOSE UR STRIP-MCNC: NEGATIVE MG/DL
KETONES UR QL: NEGATIVE
LEUKOCYTE EST, POC: NEGATIVE
NITRITE UR-MCNC: NEGATIVE MG/ML
PH UR: 6 [PH] (ref 5–8)
PROT UR STRIP-MCNC: NEGATIVE MG/DL
RBC # UR STRIP: ABNORMAL /UL
SP GR UR: 1.01 (ref 1–1.03)
UROBILINOGEN UR QL: NORMAL

## 2022-10-14 ENCOUNTER — PATIENT MESSAGE (OUTPATIENT)
Dept: OBSTETRICS AND GYNECOLOGY | Facility: CLINIC | Age: 47
End: 2022-10-14

## 2022-10-14 ENCOUNTER — PATIENT ROUNDING (BHMG ONLY) (OUTPATIENT)
Dept: OBSTETRICS AND GYNECOLOGY | Facility: CLINIC | Age: 47
End: 2022-10-14

## 2022-10-14 LAB
BACTERIA UR CULT: NORMAL
BACTERIA UR CULT: NORMAL

## 2022-10-14 NOTE — PROGRESS NOTES
My chart message has been sent to the patient for PATIENT ROUNDING with [facility].

## 2022-12-02 ENCOUNTER — TELEPHONE (OUTPATIENT)
Dept: OBSTETRICS AND GYNECOLOGY | Facility: CLINIC | Age: 47
End: 2022-12-02

## 2022-12-02 LAB
FSH SERPL-ACNC: 4.5 MIU/ML
LH SERPL-ACNC: 4.8 MIU/ML

## 2023-01-24 ENCOUNTER — TELEPHONE (OUTPATIENT)
Dept: INTERNAL MEDICINE | Age: 48
End: 2023-01-24

## 2023-01-24 NOTE — TELEPHONE ENCOUNTER
Caller: Sonali Lantigua    Relationship: Self    Best call back number: [phone]    What medication are you requesting: AZITHROMYCIN    What are your current symptoms: PATIENT IS IN AVIATION AND TRAVELING TO [facility] ON 2/3, SHE WAS TOLD BY THE NURSE FOR THE COMPANY THAT SHE WILL NEED THIS WITH HER JUST IN CASE.     If a prescription is needed, what is your preferred pharmacy and phone number: [id number] [place], [place] - [address] AT [facility] [address]  - [phone]  - [phone] FX     Additional notes: PLEASE ADVISE. PATIENT IS TRAVELING TO [facility] RIGHT NOW SO PLEASE FEEL FREE TO SEND A [employer] MESSAGE IF SHE DOES NOT ANSWER.

## 2023-01-30 DIAGNOSIS — A09 TRAVELER'S DIARRHEA: Primary | ICD-10-CM

## 2023-01-30 RX ORDER — CIPROFLOXACIN 500 MG/1
500 TABLET, FILM COATED ORAL 2 TIMES DAILY
Qty: 6 TABLET | Refills: 0 | Status: SHIPPED | OUTPATIENT
Start: 2023-01-30

## 2023-01-30 NOTE — TELEPHONE ENCOUNTER
----- Message from Lionel Hahn MD sent at 1/30/2023 11:38 AM EST -----  Regarding: FW: Adilene antibioticd  Contact: [phone]  If it's for traveller's diarrhea Cipro 500 mg BID x 3 days. Send order(s) to me for authorization.   ----- Message -----  From: Delisa Acosta MA  Sent: 1/30/2023   8:48 AM EST  To: Lionel Hahn MD  Subject: FW: Adilene antibioticd                              ----- Message -----  From: Sonali Lantigua  Sent: 1/27/2023   5:03 PM EST  To: [facility] Clinical Pool  Subject: [facility] antibioticd                                [facility] Dr. Hahn. I spoke with the office about requesting travel medication for an upcoming trip. The office left me a message inquiring about why I need it. It has been recommended to me by the company travel nurse ,that I obtain antibiotics for travelers diarrhea just in case they are needed. 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 She said there are several prescriptions that can be used such as Cipro. We will be staying in [facility].  Happy New Year!  Thank you,  Sonali Lantigua

## 2023-04-19 ENCOUNTER — OFFICE VISIT (OUTPATIENT)
Dept: INTERNAL MEDICINE | Age: 48
End: 2023-04-19
Payer: COMMERCIAL

## 2023-04-19 VITALS
HEIGHT: 71 IN | HEART RATE: 74 BPM | TEMPERATURE: 97.7 F | SYSTOLIC BLOOD PRESSURE: 100 MMHG | WEIGHT: 136 LBS | OXYGEN SATURATION: 99 % | BODY MASS INDEX: 19.04 KG/M2 | DIASTOLIC BLOOD PRESSURE: 74 MMHG

## 2023-04-19 DIAGNOSIS — R91.1 RIGHT UPPER LOBE PULMONARY NODULE: Primary | ICD-10-CM

## 2023-04-19 DIAGNOSIS — G47.00 INSOMNIA, UNSPECIFIED TYPE: ICD-10-CM

## 2023-04-19 DIAGNOSIS — R06.00 DYSPNEA, UNSPECIFIED TYPE: Chronic | ICD-10-CM

## 2023-04-19 PROBLEM — A04.2: Status: RESOLVED | Noted: 2021-10-29 | Resolved: 2023-04-19

## 2023-04-19 PROBLEM — R94.31 HOLTER MONITOR, ABNORMAL: Status: RESOLVED | Noted: 2018-07-02 | Resolved: 2023-04-19

## 2023-04-19 PROBLEM — U07.1 COVID-19 VIRUS INFECTION: Status: RESOLVED | Noted: 2020-12-20 | Resolved: 2023-04-19

## 2023-04-19 PROBLEM — R10.9 ABDOMINAL PAIN: Status: RESOLVED | Noted: 2022-08-23 | Resolved: 2023-04-19

## 2023-04-19 PROBLEM — U09.9 POST-COVID SYNDROME: Status: RESOLVED | Noted: 2021-03-29 | Resolved: 2023-04-19

## 2023-04-19 PROBLEM — R07.89 ATYPICAL CHEST PAIN: Status: RESOLVED | Noted: 2021-01-25 | Resolved: 2023-04-19

## 2023-04-19 PROBLEM — E87.1 HYPONATREMIA: Status: RESOLVED | Noted: 2021-10-29 | Resolved: 2023-04-19

## 2023-04-19 PROBLEM — A09 INFECTIOUS COLITIS: Status: RESOLVED | Noted: 2021-10-28 | Resolved: 2023-04-19

## 2023-04-19 PROBLEM — Z86.16 HISTORY OF COVID-19: Status: RESOLVED | Noted: 2021-01-25 | Resolved: 2023-04-19

## 2023-04-19 PROCEDURE — 99214 OFFICE O/P EST MOD 30 MIN: CPT | Performed by: INTERNAL MEDICINE

## 2023-04-19 RX ORDER — TRAZODONE HYDROCHLORIDE 50 MG/1
50 TABLET ORAL NIGHTLY PRN
Qty: 30 TABLET | Refills: 2 | Status: SHIPPED | OUTPATIENT
Start: 2023-04-19

## 2023-04-19 RX ORDER — CHLORAL HYDRATE 500 MG
1000 CAPSULE ORAL DAILY
COMMUNITY

## 2023-04-19 NOTE — ASSESSMENT & PLAN NOTE
Chronic dyspnea of unclear etiology.  At this time continue montelukast.  She reports having had no significant improvement on inhalers.  Previous pulmonary function test result was reviewed with nonspecific findings.  I offered to refer her to a pulmonologist but she would like to hold off on this for now.  Consider checking an echocardiogram pending findings of the CT.  Discussed plans in detail with the patient and she is in agreement.

## 2023-04-19 NOTE — PROGRESS NOTES
"    I N T E R N A L  M E D I C I N E    J U N O H  K I M,  M D      ENCOUNTER DATE:  04/19/2023    Sonali Lantigua / 47 y.o. / female    CHIEF COMPLAINT / REASON FOR OFFICE VISIT     Lung Nodule (RUL nodule is larger), Chronic dyspnea, and Insomnia      ASSESSMENT & PLAN     Problem List Items Addressed This Visit        High    Right upper lobe pulmonary nodule - Primary (Chronic)    Current Assessment & Plan     RUL nodule that is larger compared to prior imaging.   Check CT chest for further evaluation.         Relevant Medications    montelukast (SINGULAIR) 10 MG tablet    Other Relevant Orders    CT Chest Without Contrast Diagnostic       Medium    Dyspnea (Chronic)    Current Assessment & Plan     Chronic dyspnea of unclear etiology.  At this time continue montelukast.  She reports having had no significant improvement on inhalers.  Previous pulmonary function test result was reviewed with nonspecific findings.  I offered to refer her to a pulmonologist but she would like to hold off on this for now.  Consider checking an echocardiogram pending findings of the CT.  Discussed plans in detail with the patient and she is in agreement.            Low    Insomnia (Chronic)    Current Assessment & Plan     Restart trazodone 50 mg nightly as needed which has worked for her in the past.         Relevant Medications    traZODone (DESYREL) 50 MG tablet     Orders Placed This Encounter   Procedures   • CT Chest Without Contrast Diagnostic     New Medications Ordered This Visit   Medications   • traZODone (DESYREL) 50 MG tablet     Sig: Take 1 tablet by mouth At Night As Needed for Sleep.     Dispense:  30 tablet     Refill:  2       SUMMARY/DISCUSSION  •       Next Appointment with me: Visit date not found    No follow-ups on file.      VITAL SIGNS     Vitals:    04/19/23 1253   BP: 100/74   Pulse: 74   Temp: 97.7 °F (36.5 °C)   SpO2: 99%   Weight: 61.7 kg (136 lb)   Height: 180.3 cm (71\")       BP Readings from Last 3 " Encounters:   04/19/23 100/74   10/12/22 124/86   09/06/22 111/85     Wt Readings from Last 3 Encounters:   04/19/23 61.7 kg (136 lb)   10/12/22 63.3 kg (139 lb 9.6 oz)   09/06/22 61.2 kg (135 lb)     Body mass index is 18.97 kg/m².    Blood pressure readings recorded on patient flowsheet:       View : No data to display.                 MEDICATIONS AT THE TIME OF OFFICE VISIT     Current Outpatient Medications on File Prior to Visit   Medication Sig   • Biotin 93798 MCG tablet    • Cholecalciferol (Vitamin D3) 50 MCG (2000 UT) tablet    • montelukast (SINGULAIR) 10 MG tablet Take 1 tablet by mouth every night at bedtime.   • Multiple Vitamins-Minerals (WOMENS MULTI GUMMIES PO)    • Probiotic Product (Culturelle Probiotics) chewable tablet    • Ciprofloxacin (Cipro) 500 MG tablet Take 1 tablet by mouth 2 (Two) Times a Day.   • Omega-3 Fatty Acids (fish oil) 1000 MG capsule capsule Take 1 capsule by mouth Daily.   • Tri-Sprintec 0.18/0.215/0.25 MG-35 MCG per tablet Take 1 tablet by mouth Daily. (Patient not taking: Reported on 4/19/2023)   • Wixela Inhub 250-50 MCG/ACT DISKUS INHALE 1 PUFF BY MOUTH EVERY 12 HOURS. RINSE MOUTH AFTER USE (Patient not taking: Reported on 4/19/2023)     No current facility-administered medications on file prior to visit.        HISTORY OF PRESENT ILLNESS     Recent right shoulder xray showed an enlarged RUL nodule that is now 1 cm in size. She will need a CT chest for further evaluation. She complains of chronic low grade dyspnea which is not exertional in nature. She exercise regularly and does not have worse symptoms than at rest.  Prior PFT showed nonspecific mild findings.  She denies any chest pains, palpitations, chronic cough or wheezing.  She is on montelukast daily but did not find Wixela inhaler to be of any benefit.  She complains of ongoing sleep difficulties.  In the past trazodone was helpful and tolerated.      Patient Care Team:  Lionel Hahn MD as PCP - General (Internal  Medicine)  Charisse Waller PA as Physician Assistant (Obstetrics and Gynecology)    REVIEW OF SYSTEMS     Review of Systems       PHYSICAL EXAMINATION     Physical Exam  Musculoskeletal:      Thoracic back: Scoliosis present.       Alert with normal thought and judgment.   Normal affect and mood.  Cardiovascular: Normal rate, regular rhythm.  Pulm/Chest: Effort normal, breath sounds normal.   No lower extremity edema       REVIEWED DATA     Labs:     Lab Results   Component Value Date     08/11/2022    K 4.1 08/11/2022    CALCIUM 8.9 08/11/2022    AST 16 08/11/2022    ALT 16 08/11/2022    BUN 9 08/11/2022    CREATININE 0.84 08/11/2022    CREATININE 0.55 (L) 10/30/2021    CREATININE 0.77 10/28/2021    EGFRIFNONA 120 10/30/2021       No results found for: HGBA1C    Lab Results   Component Value Date     (H) 06/06/2018    HDL 68 06/06/2018    TRIG 106 06/06/2018       Lab Results   Component Value Date    TSH 1.030 08/11/2022    TSH 1.480 01/14/2021    TSH 1.210 06/06/2018    FREET4 0.81 (L) 08/11/2022    FREET4 1.15 01/14/2021    FREET4 1.10 06/06/2018       Lab Results   Component Value Date    WBC 6.01 08/11/2022    HGB 13.0 08/11/2022     08/11/2022       No results found for: MALBCRERATIO         Imaging:           Medical Tests:   XR Shoulder Comp Min 2 Vw RT (04/08/2023 11:05)  IMPRESSION:   1. No osseous abnormality.   2. 1 cm nodular opacity at the right lung apex which would favor sequela of pleural parenchymal scarring, but more pronounced when compared to the prior study. Dedicated CT examination of the chest is recommended to exclude an underlying nodule.     Pulmonary Function Test (03/15/2021 11:14)  Flow volume loops reviewed and demonstrate good patient effort the loops are relatively normal in appearance.  The FVC is reduced the FEV1 is reduced the FEV1 to FVC ratio is within normal limits but it is towards the lower sides of normal.  This pattern raises the possibility of a  mixed restrictive and obstructive process.  Lung volume testing technically had total lung capacity and residual volume are within normal limits though they are towards the lower ends of the normal range.  The diffusion capacity for carbon side is mildly reduced and this persist when correcting for patient's hemoglobin.     Impression: These pulmonary function studies strictly speaking have a mild diffusion deficit that suggest a pulmonary vascular process although there are features as noted above where I could not exclude some mixed obstructive and restrictive process neither would be very severe I recommend clinical correlation.      Summary of old records / correspondence / consultant report:           Request outside records:             *Examiner was wearing KN95 mask during the entire duration of the visit. Patient was masked the entire time. Minimum social distance of 6 ft maintained entire visit except if physical contact was necessary as documented.       Template created by Josue Hahn MD

## 2023-04-24 ENCOUNTER — TELEPHONE (OUTPATIENT)
Dept: INTERNAL MEDICINE | Age: 48
End: 2023-04-24

## 2023-04-24 NOTE — TELEPHONE ENCOUNTER
Caller: Sonali Lantigua    Relationship: Self    Best call back number: [phone]    What was the call regarding: PATIENT WAS CALLING REGARDING HER CHEST CT SCAN THAT DR PETERS ORDERED. SHE HAS NOT HEARD ANYTHING BACK YET REGARDING GETTING THAT SCHEDULED. PLEASE ADVISE IF ORDERS ARE IN.     Do you require a callback: YES

## 2023-05-09 ENCOUNTER — HOSPITAL ENCOUNTER (OUTPATIENT)
Dept: CT IMAGING | Facility: HOSPITAL | Age: 48
Discharge: HOME | End: 2023-05-09
Admitting: INTERNAL MEDICINE
Payer: COMMERCIAL

## 2023-05-09 PROCEDURE — 71250 CT THORAX DX C-: CPT

## 2023-05-19 ENCOUNTER — TELEPHONE (OUTPATIENT)
Dept: INTERNAL MEDICINE | Age: 48
End: 2023-05-19
Payer: COMMERCIAL

## 2023-05-19 DIAGNOSIS — R06.00 DYSPNEA, UNSPECIFIED TYPE: Primary | ICD-10-CM

## 2023-05-19 DIAGNOSIS — R91.1 RIGHT UPPER LOBE PULMONARY NODULE: ICD-10-CM

## 2023-05-19 NOTE — TELEPHONE ENCOUNTER
----- Message from Sonali Lantigua sent at 5/19/2023  1:29 PM EDT -----  Regarding: result/CT  Contact: [phone]  Roge Hercules,  Yes, please refer me to a pulmonologist.    Thank you,  Sonali

## 2023-09-19 ENCOUNTER — OFFICE VISIT (OUTPATIENT)
Dept: INTERNAL MEDICINE | Age: 48
End: 2023-09-19
Payer: COMMERCIAL

## 2023-09-19 VITALS
WEIGHT: 134 LBS | SYSTOLIC BLOOD PRESSURE: 130 MMHG | OXYGEN SATURATION: 100 % | HEART RATE: 110 BPM | TEMPERATURE: 97.6 F | BODY MASS INDEX: 18.76 KG/M2 | DIASTOLIC BLOOD PRESSURE: 80 MMHG | HEIGHT: 71 IN

## 2023-09-19 DIAGNOSIS — H65.91 RIGHT OTITIS MEDIA WITH EFFUSION: Primary | ICD-10-CM

## 2023-09-19 PROCEDURE — 99213 OFFICE O/P EST LOW 20 MIN: CPT

## 2023-09-19 RX ORDER — AZELASTINE 1 MG/ML
2 SPRAY, METERED NASAL 2 TIMES DAILY
Qty: 30 ML | Refills: 12 | Status: SHIPPED | OUTPATIENT
Start: 2023-09-19

## 2023-09-19 RX ORDER — METHYLPREDNISOLONE 4 MG/1
TABLET ORAL
Qty: 21 TABLET | Refills: 0 | Status: SHIPPED | OUTPATIENT
Start: 2023-09-19

## 2023-09-19 NOTE — PROGRESS NOTES
"    I N T E R N A L  M E D I C I N E  Genet Hooks, APRKIRK    ENCOUNTER DATE:  09/19/2023    Sonali Lantigua / 48 y.o. / female      CHIEF COMPLAINT / REASON FOR OFFICE VISIT     Earache (Both ears )      ASSESSMENT & PLAN     Diagnoses and all orders for this visit:    1. Right otitis media with effusion (Primary)  -     azelastine (ASTELIN) 0.1 % nasal spray; 2 sprays into the nostril(s) as directed by provider 2 (Two) Times a Day. Use in each nostril as directed  Dispense: 30 mL; Refill: 12  -     methylPREDNISolone (MEDROL) 4 MG dose pack; Take as directed on package instructions.  Dispense: 21 tablet; Refill: 0         SUMMARY/DISCUSSION  Recommend adding azelastine nasal spray to help with allergic rhinitis symptoms.  Discussed risks/ benefits/ side effects of steroid use.  She was educated to avoid OTC decongestants as these can elevate HR and BP.  She was educated to follow up closely with ENT for re assessment.        Next Appointment with me: Visit date not found    Return for Annual physical with Dr. Hahn.      VITAL SIGNS     Visit Vitals  /80   Pulse 110   Temp 97.6 °F (36.4 °C)   Ht 180.3 cm (70.98\")   Wt 60.8 kg (134 lb)   LMP 09/17/2023   SpO2 100%   BMI 18.70 kg/m²             Wt Readings from Last 3 Encounters:   09/19/23 60.8 kg (134 lb)   04/19/23 61.7 kg (136 lb)   10/12/22 63.3 kg (139 lb 9.6 oz)     Body mass index is 18.7 kg/m².        MEDICATIONS AT THE TIME OF OFFICE VISIT     Current Outpatient Medications on File Prior to Visit   Medication Sig Dispense Refill    Biotin 25834 MCG tablet       Cholecalciferol (Vitamin D3) 50 MCG (2000 UT) tablet       Multiple Vitamins-Minerals (WOMENS MULTI GUMMIES PO)       Omega-3 Fatty Acids (fish oil) 1000 MG capsule capsule Take 1 capsule by mouth Daily.      Probiotic Product (Culturelle Probiotics) chewable tablet        No current facility-administered medications on file prior to visit.        HISTORY OF PRESENT ILLNESS     Here today for ongoing " bilateral ear pressure, with right > left.  She saw [facility] in August 2023 and was prescribed amoxicillin with some initial benefit, but developed continued symptoms.  She was then re assessed and completed a steroid dose pack with some improvement, but reports continued symptoms.  She has experienced similar symptoms in the past and reports she required a second steroid dose pack to fully improve symptoms.  She has seen two different ENT specialists in the past who were unable to identify any specific problem.  Medical history significant for allergic rhinitis, and she remains on daily Cetirizine and Flonase.  She is concerned because she works as a  and is worried about the possibility of a ruptured ear drum with the barometric pressure changes.      She did take an OTC decongestant prior to today's appointment.      Patient Care Team:  Lionel Hahn MD as PCP - General (Internal Medicine)  Charisse Waller PA as Physician Assistant (Obstetrics and Gynecology)    REVIEW OF SYSTEMS     Review of Systems   Constitutional:  Negative for chills, fever and unexpected weight change.   HENT:  Positive for ear pain. Negative for ear discharge, hearing loss, rhinorrhea, sinus pressure, sinus pain and sore throat.    Respiratory:  Negative for cough, chest tightness and shortness of breath.    Cardiovascular:  Negative for chest pain, palpitations and leg swelling.   Gastrointestinal:  Negative for abdominal pain, diarrhea and vomiting.   Musculoskeletal:  Negative for neck pain.   Skin:  Negative for rash.   Neurological:  Negative for dizziness, weakness, light-headedness and headaches.   Psychiatric/Behavioral:  The patient is not nervous/anxious.         PHYSICAL EXAMINATION     Physical Exam  Vitals reviewed.   Constitutional:       General: She is not in acute distress.     Appearance: Normal appearance. She is not ill-appearing, toxic-appearing or diaphoretic.   HENT:      Head:  Normocephalic and atraumatic.      Right Ear: Ear canal and external ear normal. A middle ear effusion is present. There is no impacted cerumen.      Left Ear: Ear canal and external ear normal. A middle ear effusion is present. There is no impacted cerumen.      Nose: Nose normal. No congestion or rhinorrhea.      Mouth/Throat:      Mouth: Mucous membranes are moist.      Pharynx: Oropharynx is clear. No oropharyngeal exudate or posterior oropharyngeal erythema.   Cardiovascular:      Rate and Rhythm: Normal rate and regular rhythm.      Heart sounds: Normal heart sounds.   Pulmonary:      Effort: Pulmonary effort is normal.      Breath sounds: Normal breath sounds.   Musculoskeletal:      Right lower leg: No edema.      Left lower leg: No edema.   Lymphadenopathy:      Cervical: No cervical adenopathy.   Neurological:      Mental Status: She is alert and oriented to person, place, and time. Mental status is at baseline.   Psychiatric:         Mood and Affect: Mood normal.         Behavior: Behavior normal.         Thought Content: Thought content normal.         Judgment: Judgment normal.         REVIEWED DATA     Labs:           Imaging:            Medical Tests:           Summary of old records / correspondence / consultant report:           Request outside records:

## 2023-09-26 ENCOUNTER — TELEPHONE (OUTPATIENT)
Dept: OBSTETRICS AND GYNECOLOGY | Facility: CLINIC | Age: 48
End: 2023-09-26

## 2023-09-26 ENCOUNTER — OFFICE VISIT (OUTPATIENT)
Dept: OBSTETRICS AND GYNECOLOGY | Facility: CLINIC | Age: 48
End: 2023-09-26
Payer: COMMERCIAL

## 2023-09-26 VITALS
HEIGHT: 71 IN | WEIGHT: 136.8 LBS | SYSTOLIC BLOOD PRESSURE: 110 MMHG | BODY MASS INDEX: 19.15 KG/M2 | DIASTOLIC BLOOD PRESSURE: 80 MMHG

## 2023-09-26 DIAGNOSIS — N92.6 IRREGULAR MENSES: ICD-10-CM

## 2023-09-26 DIAGNOSIS — N94.10 DYSPAREUNIA, FEMALE: ICD-10-CM

## 2023-09-26 DIAGNOSIS — N89.8 VAGINAL ODOR: ICD-10-CM

## 2023-09-26 DIAGNOSIS — Z12.4 SCREENING FOR CERVICAL CANCER: ICD-10-CM

## 2023-09-26 DIAGNOSIS — N89.8 VAGINAL DISCHARGE: Primary | ICD-10-CM

## 2023-09-26 DIAGNOSIS — N64.4 BREAST PAIN: ICD-10-CM

## 2023-09-26 RX ORDER — METRONIDAZOLE 500 MG/1
500 TABLET ORAL 2 TIMES DAILY
Qty: 14 TABLET | Refills: 0 | Status: SHIPPED | OUTPATIENT
Start: 2023-09-26 | End: 2023-10-03

## 2023-09-26 RX ORDER — GUAIFENESIN AND PSEUDOEPHEDRINE HCL 1200; 120 MG/1; MG/1
TABLET, EXTENDED RELEASE ORAL
COMMUNITY
Start: 2023-06-20

## 2023-09-26 RX ORDER — CETIRIZINE HYDROCHLORIDE 10 MG/1
TABLET ORAL
COMMUNITY
Start: 2023-04-04

## 2023-09-26 NOTE — PROGRESS NOTES
Chief Complaint   Patient presents with    Follow-up     Patient is here today C/O irregular grayish vaginal discharge with an odor. x 6 weeks. Pain during intercourse, painful breasts, with cramping and irregular periods. Denies pelvic pain.       SUBJECTIVE:     Sonali Lantigua is a 48 y.o.  who presents with c/o grey vaginal discharge and odor for 6-8 weeks. This started after last IC, denies new partners. Reports monogamous relationship. Denies fish like odor.  Symptoms are slightly improved since LMP on 23. Denies vaginal itching. Denies a change in soaps, hygiene products. She is not using douches.  Denies pelvic pain or dysuria. C/o irregular menses for several years, however this is worsening. She is having menses twice a month.  She is not having hot flashes or night sweats. Partner has had vasectomy.  Reports pain with last IC, which is unusual for her. C/o breast tenderness for several months. Last mammogram . Denies breast dimpling or puckering, no drainage from nipple. This is a new problem. Last AE was over one year ago. Prior gyn care with [facility], she is unsure when last pap smear was. Reports hx abnormal pap with conization in     This is my first time meeting Sonali Lantigua  She is a patient of Dr. Lucero.    Past Medical History:   Diagnosis Date    Allergic     Allergic rhinitis     Asthma 2020    Covid related    COVID-19 virus infection 2020    Headache 2021    History of medical problems 2021    Uterine fiberoid- D&C    Scoliosis       Past Surgical History:   Procedure Laterality Date    COLONOSCOPY N/A 2022    Procedure: COLONOSCOPY into cecum;  Surgeon: Geo Mendoza MD;  Location: [facility] ENDOSCOPY;  Service: Gastroenterology;  Laterality: N/A;  pre: colitis   post: normal    D & C WITH SUCTION  2021    DILATATION AND CURETTAGE          Review of Systems   Constitutional:  Negative for chills, fatigue and fever.  "  Gastrointestinal:  Negative for abdominal distention, abdominal pain, nausea and vomiting.   Endocrine: Negative for cold intolerance and heat intolerance.   Genitourinary:  Positive for dyspareunia, menstrual problem and vaginal discharge. Negative for dysuria, frequency, pelvic pain, vaginal bleeding and vaginal pain.        + vaginal odor  - vaginal itching  + bilateral breast pain  - nipple drainage  - breast dimpling or puckering       OBJECTIVE:   Vitals:    09/26/23 1356   BP: 110/80   Weight: 62.1 kg (136 lb 12.8 oz)   Height: 180.3 cm (70.98\")        Physical Exam  Constitutional:       General: She is not in acute distress.     Appearance: Normal appearance. She is not ill-appearing, toxic-appearing or diaphoretic.   Genitourinary:      Vulva, bladder and urethral meatus normal.      No lesions in the vagina.      Right Labia: No rash, tenderness, lesions, skin changes or Bartholin's cyst.     Left Labia: No tenderness, lesions, skin changes, Bartholin's cyst or rash.     No labial fusion noted.      No inguinal adenopathy present in the right or left side.     Vaginal discharge (frothy, white, moderate amount) present.      No vaginal erythema, tenderness, bleeding or ulceration.      No vaginal prolapse present.     No vaginal atrophy present.       Right Adnexa: not tender, not full, not palpable, no mass present and not absent.     Left Adnexa: not tender, not full, not palpable, no mass present and not absent.     Cervical friability present.      No cervical motion tenderness, discharge, lesion, polyp, nabothian cyst or eversion.      Uterus is not enlarged, fixed, tender, irregular or prolapsed.      No uterine mass detected.     No urethral tenderness or mass present.      Pelvic exam was performed with patient in the lithotomy position.   Breasts:     Breasts are symmetrical.      Right: Present. No swelling, bleeding, inverted nipple, mass, nipple discharge, skin change, tenderness or breast " implant.      Left: Present. No swelling, bleeding, inverted nipple, mass, nipple discharge, skin change, tenderness or breast implant.   HENT:      Head: Normocephalic and atraumatic.   Eyes:      Pupils: Pupils are equal, round, and reactive to light.   Cardiovascular:      Rate and Rhythm: Normal rate.   Pulmonary:      Effort: Pulmonary effort is normal.   Abdominal:      General: There is no distension.      Palpations: Abdomen is soft. There is no mass.      Tenderness: There is no abdominal tenderness. There is no guarding.      Hernia: No hernia is present. There is no hernia in the left inguinal area or right inguinal area.   Musculoskeletal:         General: Normal range of motion.      Cervical back: Normal range of motion and neck supple. No tenderness.   Lymphadenopathy:      Cervical: No cervical adenopathy.      Upper Body:      Right upper body: No supraclavicular, axillary or pectoral adenopathy.      Left upper body: No supraclavicular, axillary or pectoral adenopathy.      Lower Body: No right inguinal adenopathy. No left inguinal adenopathy.   Neurological:      General: No focal deficit present.      Mental Status: She is alert and oriented to person, place, and time.      Cranial Nerves: No cranial nerve deficit.   Skin:     General: Skin is warm and dry.   Psychiatric:         Mood and Affect: Mood normal.         Behavior: Behavior normal.         Thought Content: Thought content normal.         Judgment: Judgment normal.   Vitals and nursing note reviewed.       Assessment/Plan    Diagnoses and all orders for this visit:    1. Vaginal discharge (Primary)  -     NuSwab VG+ - Swab, Vagina  -     metroNIDAZOLE (Flagyl) 500 MG tablet; Take 1 tablet by mouth 2 (Two) Times a Day for 7 days.  Dispense: 14 tablet; Refill: 0    2. Vaginal odor  -     NuSwab VG+ - Swab, Vagina  -     metroNIDAZOLE (Flagyl) 500 MG tablet; Take 1 tablet by mouth 2 (Two) Times a Day for 7 days.  Dispense: 14 tablet;  Refill: 0    3. Dyspareunia, female    4. Breast pain  -     US Breast Bilateral Complete; Future  -     Mammo Diagnostic Digital Tomosynthesis Bilateral With CAD; Future    5. Irregular menses  -     Estradiol  -     TSH  -     Follicle Stimulating Hormone  -     CBC & Differential  -     Hemoglobin A1c  -     Vitamin D,25-Hydroxy    6. Screening for cervical cancer  -     IGP, Apt HPV,rfx 16 / 18,45      Vaginal discharge and odor:   Thin, frothy discharge noted, cervix is erythematous, no odor noted. BV vs trichomonas. This was discussed with pt  Will begin flagyl at this time, she was advised to avoid alcohol while taking flagyl   Vaginal cultures obtained  Encouraged condoms with IC, cotton only underwear, mild or no soaps, avoidance of douching or vita steaming    Dyspareunia:   Isolated incident at this time  If normal NuSwab consider TVUS or vaginal estrogen.     Breat pain:  Normal breast exam today in office  Will complete bilateral diagnostic mammogram and bilateral breast u/s to further evaluate    Irregular menses:   Likely R/T perimenopause  Will check labs today  Consider TVUS and/or EMB if no improvement     Screening for cervical cancer:   Overdue to AE  Unable to prior pap smear results  Collected today    Follow up will be based on results, if normal labs, pap smear, and NuSwab recommend AE in one year    Return in 1 year (on 9/26/2024), or if symptoms worsen or fail to improve, for Annual physical.    I spent 45 minutes caring for Sonali on this date of service. This time includes time spent by me in the following activities: preparing for the visit, reviewing tests, obtaining and/or reviewing a separately obtained history, performing a medically appropriate examination and/or evaluation, counseling and educating the patient/family/caregiver, ordering medications, tests, or procedures, referring and communicating with other health care professionals, and documenting information in the medical  record    Cindy Kern, APRN  9/26/2023  14:57 EDT

## 2023-09-26 NOTE — TELEPHONE ENCOUNTER
Pt was in office and we scheduled her DX Bilat Mammo & Bilat Comp US at [facility] 10/4/23 @ 11 & 1130am.

## 2023-09-27 LAB
25(OH)D3+25(OH)D2 SERPL-MCNC: 49.1 NG/ML (ref 30–100)
BASOPHILS # BLD AUTO: 0 X10E3/UL (ref 0–0.2)
BASOPHILS NFR BLD AUTO: 0 %
EOSINOPHIL # BLD AUTO: 0.1 X10E3/UL (ref 0–0.4)
EOSINOPHIL NFR BLD AUTO: 1 %
ERYTHROCYTE [DISTWIDTH] IN BLOOD BY AUTOMATED COUNT: 11.1 % (ref 11.7–15.4)
ESTRADIOL SERPL-MCNC: 447 PG/ML
FSH SERPL-ACNC: 6.5 MIU/ML
HBA1C MFR BLD: 5.4 % (ref 4.8–5.6)
HCT VFR BLD AUTO: 42.3 % (ref 34–46.6)
HGB BLD-MCNC: 14.2 G/DL (ref 11.1–15.9)
IMM GRANULOCYTES # BLD AUTO: 0 X10E3/UL (ref 0–0.1)
IMM GRANULOCYTES NFR BLD AUTO: 0 %
LYMPHOCYTES # BLD AUTO: 2.9 X10E3/UL (ref 0.7–3.1)
LYMPHOCYTES NFR BLD AUTO: 35 %
MCH RBC QN AUTO: 32.7 PG (ref 26.6–33)
MCHC RBC AUTO-ENTMCNC: 33.6 G/DL (ref 31.5–35.7)
MCV RBC AUTO: 98 FL (ref 79–97)
MONOCYTES # BLD AUTO: 0.6 X10E3/UL (ref 0.1–0.9)
MONOCYTES NFR BLD AUTO: 7 %
NEUTROPHILS # BLD AUTO: 4.8 X10E3/UL (ref 1.4–7)
NEUTROPHILS NFR BLD AUTO: 57 %
PLATELET # BLD AUTO: 238 X10E3/UL (ref 150–450)
RBC # BLD AUTO: 4.34 X10E6/UL (ref 3.77–5.28)
TSH SERPL DL<=0.005 MIU/L-ACNC: 1.12 UIU/ML (ref 0.45–4.5)
WBC # BLD AUTO: 8.5 X10E3/UL (ref 3.4–10.8)

## 2023-09-28 LAB
A VAGINAE DNA VAG QL NAA+PROBE: ABNORMAL SCORE
BVAB2 DNA VAG QL NAA+PROBE: ABNORMAL SCORE
C ALBICANS DNA VAG QL NAA+PROBE: NEGATIVE
C GLABRATA DNA VAG QL NAA+PROBE: NEGATIVE
C TRACH DNA VAG QL NAA+PROBE: NEGATIVE
MEGA1 DNA VAG QL NAA+PROBE: ABNORMAL SCORE
N GONORRHOEA DNA VAG QL NAA+PROBE: NEGATIVE
T VAGINALIS DNA VAG QL NAA+PROBE: NEGATIVE

## 2023-10-01 LAB
CYTOLOGIST CVX/VAG CYTO: NORMAL
CYTOLOGY CVX/VAG DOC CYTO: NORMAL
CYTOLOGY CVX/VAG DOC THIN PREP: NORMAL
DX ICD CODE: NORMAL
HIV 1 & 2 AB SER-IMP: NORMAL
HPV I/H RISK 4 DNA CVX QL PROBE+SIG AMP: NEGATIVE
Lab: NORMAL
OTHER STN SPEC: NORMAL
STAT OF ADQ CVX/VAG CYTO-IMP: NORMAL

## 2023-11-16 ENCOUNTER — APPOINTMENT (OUTPATIENT)
Dept: WOMENS IMAGING | Facility: HOSPITAL | Age: 48
End: 2023-11-16
Payer: COMMERCIAL

## 2023-11-16 PROCEDURE — 77063 BREAST TOMOSYNTHESIS BI: CPT | Performed by: RADIOLOGY

## 2023-11-16 PROCEDURE — 77067 SCR MAMMO BI INCL CAD: CPT | Performed by: RADIOLOGY

## 2023-11-21 DIAGNOSIS — N64.4 BREAST PAIN: ICD-10-CM

## 2023-12-26 ENCOUNTER — OFFICE VISIT (OUTPATIENT)
Dept: INTERNAL MEDICINE | Age: 48
End: 2023-12-26
Payer: COMMERCIAL

## 2023-12-26 VITALS
HEIGHT: 71 IN | TEMPERATURE: 97.5 F | HEART RATE: 77 BPM | BODY MASS INDEX: 19.32 KG/M2 | WEIGHT: 138 LBS | DIASTOLIC BLOOD PRESSURE: 72 MMHG | OXYGEN SATURATION: 100 % | SYSTOLIC BLOOD PRESSURE: 110 MMHG

## 2023-12-26 DIAGNOSIS — E53.8 B12 DEFICIENCY: ICD-10-CM

## 2023-12-26 DIAGNOSIS — Z00.00 ENCOUNTER FOR ANNUAL HEALTH EXAMINATION: Primary | ICD-10-CM

## 2023-12-26 DIAGNOSIS — F51.01 PRIMARY INSOMNIA: Chronic | ICD-10-CM

## 2023-12-26 DIAGNOSIS — R73.01 IFG (IMPAIRED FASTING GLUCOSE): ICD-10-CM

## 2023-12-26 DIAGNOSIS — J45.20 MILD INTERMITTENT REACTIVE AIRWAY DISEASE WITHOUT COMPLICATION: ICD-10-CM

## 2023-12-26 PROBLEM — J45.909 REACTIVE AIRWAY DISEASE: Status: ACTIVE | Noted: 2023-12-26

## 2023-12-26 RX ORDER — ALBUTEROL SULFATE 90 UG/1
2 AEROSOL, METERED RESPIRATORY (INHALATION) EVERY 4 HOURS PRN
Qty: 8 G | Refills: 2 | Status: SHIPPED | OUTPATIENT
Start: 2023-12-26

## 2023-12-26 RX ORDER — CYANOCOBALAMIN (VITAMIN B-12) 100 MCG
1 TABLET ORAL DAILY
COMMUNITY
Start: 2023-12-26

## 2023-12-26 RX ORDER — TRAZODONE HYDROCHLORIDE 50 MG/1
50 TABLET ORAL NIGHTLY PRN
COMMUNITY
Start: 2023-11-13 | End: 2023-12-26 | Stop reason: SDUPTHER

## 2023-12-26 RX ORDER — TRAZODONE HYDROCHLORIDE 50 MG/1
50 TABLET ORAL NIGHTLY PRN
Qty: 30 TABLET | Refills: 2 | Status: SHIPPED | OUTPATIENT
Start: 2023-12-26

## 2023-12-26 NOTE — PROGRESS NOTES
"    I N T E R N A L  M E D I C I N E    J U N O H  K I M,  M D      ENCOUNTER DATE:  12/26/2023    Sonali Nucho / 48 y.o. / female    CHIEF COMPLAINT     Annual Exam      VITALS     Vitals:    12/26/23 1453   BP: 110/72   Pulse: 77   Temp: 97.5 °F (36.4 °C)   SpO2: 100%   Weight: 62.6 kg (138 lb)   Height: 180.3 cm (70.98\")       BP Readings from Last 3 Encounters:   12/26/23 110/72   09/26/23 110/80   09/19/23 130/80     Wt Readings from Last 3 Encounters:   12/26/23 62.6 kg (138 lb)   09/26/23 62.1 kg (136 lb 12.8 oz)   09/19/23 60.8 kg (134 lb)      Body mass index is 19.26 kg/m².    Blood pressure readings recorded on patient flowsheet:       No data to display                  MEDICATIONS     Current Outpatient Medications on File Prior to Visit   Medication Sig Dispense Refill    azelastine (ASTELIN) 0.1 % nasal spray 2 sprays into the nostril(s) as directed by provider 2 (Two) Times a Day. Use in each nostril as directed 30 mL 12    Biotin 28921 MCG tablet       cetirizine (zyrTEC) 10 MG tablet       Fluticasone Propionate (FLONASE ALLERGY RELIEF NA)       Multiple Vitamins-Minerals (WOMENS MULTI GUMMIES PO)       Omega-3 Fatty Acids (fish oil) 1000 MG capsule capsule Take 1 capsule by mouth Daily.      Probiotic Product (Culturelle Probiotics) chewable tablet       [DISCONTINUED] traZODone (DESYREL) 50 MG tablet Take 1 tablet by mouth At Night As Needed for Sleep.      [DISCONTINUED] Cholecalciferol (Vitamin D3) 50 MCG (2000 UT) tablet  (Patient not taking: Reported on 12/26/2023)      [DISCONTINUED] methylPREDNISolone (MEDROL) 4 MG dose pack Take as directed on package instructions. 21 tablet 0    [DISCONTINUED] Pseudoephedrine-guaiFENesin -1200 MG tablet sustained-release 12 hour        No current facility-administered medications on file prior to visit.         HISTORY OF PRESENT ILLNESS     Sonali presents for annual health maintenance visit.    Sonali has no new concerns today and states she is " doing well. She was recently ill with respiratory symptoms that have resolved. She experienced a deep cough for 2 weeks. She walks and lifts weights 4 days a week. Her stress levels have decreased.     Recent outside laboratory studies on 2023 surprisingly noted an elevated fasting glucose of 121. Prior A1c was 5.4 percent. Previously she had an isolated elevated glucose of 127. Had an uncle with type 2 diabetes. She has no clinical symptoms of diabetes.     Her most recent vitamin B12 is 304 pg/mL. She was not instructed to take B12 supplement by APRN at that time She takes combined garlic and horse rash and multi vitamin, sand fish oil. She is not taking a vitamin D supplement.     She continues albuterol as needed and requests a refill.     Takes trazodone 25 to 50 mg as needed for insomnia with good results. Has taken melatonin in the past when she was flying. Need refill on trazodone.     Denies depression or neurological symptoms. She has a strong family history of dementia. Patient does not smoke and drinks alcohol socially, which is usually wine.       General health: some medical problems  Lifestyle:  Attempting to lose weight?: No   Diet: eats a well balanced, healthy diet  Exercise: exercises 3-5 days weekly  Tobacco: Never used   Alcohol: occasional/infrequent  Work: Full-time  Reproductive health:  Sexually active?: Yes   Sexual problems?: No problems  Concern for STD?: No    Sees Gynecologist?: Yes   Ariadna/Postmenopausal?: No   Depression Screenin/26/2023     2:59 PM   PHQ-2/PHQ-9 Depression Screening   Little Interest or Pleasure in Doing Things 0-->not at all   Feeling Down, Depressed or Hopeless 0-->not at all   PHQ-9: Brief Depression Severity Measure Score 0         PHQ-2: 0 (Not depressed)   PHQ-9: 0 (Negative screening for depression)    Patient Care Team:  Lionel Hahn MD as PCP - General (Internal Medicine)  Charisse Waller PA as Physician Assistant (Obstetrics and  Gynecology)      ALLERGIES  Allergies   Allergen Reactions    Codeine Nausea And Vomiting    Sulfa Antibiotics Hives     Burned skin    Clindamycin Phos-Benzoyl Perox Hives, Rash and Swelling        PFSH:     The following portions of the patient's history were reviewed and updated as appropriate: Allergies / Current Medications / Past Medical History / Surgical History / Social History / Family History    PROBLEM LIST   Patient Active Problem List   Diagnosis    Insomnia    Dyspnea    Abnormal PFT    Seasonal allergies    Abnormal CT scan, small bowel    Right upper lobe pulmonary nodule    IFG (impaired fasting glucose)    B12 deficiency    Reactive airway disease       PAST MEDICAL HISTORY  Past Medical History:   Diagnosis Date    Allergic     Allergic rhinitis     Asthma 12/26/2020    Covid related    COVID-19 virus infection 12/20/2020    Headache 08/23/2021    History of medical problems 12/2021    Uterine fiberoid- D&C    Scoliosis        SURGICAL HISTORY  Past Surgical History:   Procedure Laterality Date    COLONOSCOPY N/A 09/06/2022    Procedure: COLONOSCOPY into cecum;  Surgeon: Geo Mendoza MD;  Location: [facility] ENDOSCOPY;  Service: Gastroenterology;  Laterality: N/A;  pre: colitis   post: normal    D & C WITH SUCTION  12/2021    DILATATION AND CURETTAGE  2007       SOCIAL HISTORY  Social History     Socioeconomic History    Marital status: Legally     Number of children: 1   Tobacco Use    Smoking status: Never    Smokeless tobacco: Never   Vaping Use    Vaping Use: Never used   Substance and Sexual Activity    Alcohol use: Yes     Comment: Socially    Drug use: Never    Sexual activity: Yes     Partners: Male     Birth control/protection: Vasectomy       FAMILY HISTORY  Family History   Problem Relation Age of Onset    Hyperlipidemia Mother     Dementia Mother 77    Hyperlipidemia Father     Hypertension Father     Coronary artery disease Father 55        MI's, stents (nonsmoker)     Atrial fibrillation Father     No Known Problems Sister     Asthma Brother     Stroke Maternal Grandfather     No Known Problems Daughter     Alzheimer's disease Maternal Uncle 78    Diabetes type II Maternal Uncle     Dementia Maternal Grandmother 78    Cancer Neg Hx     Inflammatory bowel disease Neg Hx     Emphysema Neg Hx        IMMUNIZATION HISTORY  Immunization History   Administered Date(s) Administered    COVID-19 (MODERNA) Monovalent Original Booster 12/21/2021    COVID-19 (PFIZER) Purple Cap Monovalent 03/20/2021, 04/12/2021    Typhoid Inactivated 01/28/2023         REVIEW OF SYSTEMS     Review of Systems   Constitutional: Negative.    HENT: Negative.     Eyes: Negative.    Respiratory: Negative.  Negative for shortness of breath.         Recent upper respiratory tract infection 2 weeks ago (better)   Cardiovascular: Negative.    Gastrointestinal: Negative.    Endocrine: Negative.    Genitourinary: Negative.    Musculoskeletal: Negative.    Skin: Negative.    Allergic/Immunologic: Negative.    Neurological: Negative.    Hematological: Negative.    Psychiatric/Behavioral:  Positive for sleep disturbance.          PHYSICAL EXAMINATION     Physical Exam  Constitutional:       General: She is not in acute distress.     Appearance: Normal appearance. She is well-developed.   HENT:      Head: Normocephalic and atraumatic.      Right Ear: Tympanic membrane, ear canal and external ear normal.      Left Ear: Tympanic membrane, ear canal and external ear normal.      Mouth/Throat:      Mouth: Mucous membranes are moist.      Pharynx: Oropharynx is clear.   Eyes:      General: No scleral icterus.     Conjunctiva/sclera: Conjunctivae normal.      Pupils: Pupils are equal, round, and reactive to light.   Neck:      Thyroid: No thyroid mass or thyromegaly.      Vascular: No carotid bruit.      Trachea: No tracheal deviation.   Cardiovascular:      Rate and Rhythm: Normal rate and regular rhythm.      Pulses: Normal  pulses.      Heart sounds: Normal heart sounds.   Pulmonary:      Effort: Pulmonary effort is normal.      Breath sounds: Normal breath sounds.   Abdominal:      General: There is no distension.      Palpations: Abdomen is soft. There is no mass.      Tenderness: There is no abdominal tenderness.      Hernia: No hernia is present.   Genitourinary:     Comments: Deferred to gyne/by patient unless specified otherwise.   Musculoskeletal:      Cervical back: Neck supple.      Right lower leg: No edema.      Left lower leg: No edema.   Lymphadenopathy:      Cervical: No cervical adenopathy.   Skin:     General: Skin is warm.      Coloration: Skin is not jaundiced or pale.      Findings: No rash.   Neurological:      General: No focal deficit present.      Mental Status: She is alert and oriented to person, place, and time.      Cranial Nerves: No cranial nerve deficit.      Motor: No abnormal muscle tone.      Coordination: Coordination normal.      Deep Tendon Reflexes: Reflexes normal.   Psychiatric:         Mood and Affect: Mood normal.         Behavior: Behavior normal.         Thought Content: Thought content normal.         Judgment: Judgment normal.         REVIEWED DATA      Labs:    Lab Results   Component Value Date     08/11/2022    K 4.1 08/11/2022    CALCIUM 8.9 08/11/2022    AST 16 08/11/2022    ALT 16 08/11/2022    BUN 9 08/11/2022    CREATININE 0.84 08/11/2022    CREATININE 0.55 (L) 10/30/2021    CREATININE 0.77 10/28/2021    EGFRIFNONA 120 10/30/2021       Lab Results   Component Value Date    GLUCOSE 97 08/11/2022    HGBA1C 5.4 09/26/2023    TSH 1.120 09/26/2023    FREET4 0.81 (L) 08/11/2022       Lab Results   Component Value Date     (H) 06/06/2018    HDL 68 06/06/2018    TRIG 106 06/06/2018    CHOLHDLRATIO 3.0 06/06/2018       Lab Results   Component Value Date    SIQK80GM 49.1 09/26/2023        Lab Results   Component Value Date    WBC 8.5 09/26/2023    HGB 14.2 09/26/2023    MCV 98 (H)  "09/26/2023     09/26/2023       Lab Results   Component Value Date    GLUCOSEU Negative 08/11/2022    BLOODU Negative 08/11/2022    NITRITEU Negative 08/11/2022    LEUKOCYTESUR Negative 10/13/2022        No results found for: \"HEPCVIRUSABY\"    Imaging:    CT Chest Without Contrast Diagnostic (05/09/2023 18:59)   1.  Pleural and parenchymal scarring with sub-6 mm micronodules not  significantly changed from 04/15/2021 favoring benign etiology. If this  is a high-risk patient or smoker consider annual follow-up.  2.  Please see above for additional chronic findings.      Medical Tests:            Summary of old records / correspondence / consultant report:             Request outside records:         ASSESSMENT & PLAN     ANNUAL WELLNESS EXAM / PHYSICAL     Other medical problems addressed today:  Problem List Items Addressed This Visit          Medium    Insomnia (Chronic)    Current Assessment & Plan     Refilled trazodone 50 mg qHS PRN          Relevant Medications    traZODone (DESYREL) 50 MG tablet    B12 deficiency    Current Assessment & Plan     Lab Results   Component Value Date    IVCQIRQU40 304 08/11/2022      Start B12 1000 mcg SL daily.          Relevant Medications    Cyanocobalamin (B-12-SL) 1000 MCG sublingual tablet       Low    IFG (impaired fasting glucose)    Current Assessment & Plan     Outside fasting glucose was 121 recently. Check nonfasting labs today including A1c. I suspect it is isolated and related to alcohol or diet.     Lab Results   Component Value Date    GLUCOSE 97 08/11/2022    GLUCOSE 127 (H) 10/30/2021    GLUCOSE 92 10/28/2021     Lab Results   Component Value Date    HGBA1C 5.4 09/26/2023             Relevant Orders    Comprehensive Metabolic Panel    Hemoglobin A1c    Reactive airway disease    Current Assessment & Plan     Recent upper respiratory tract infection. Refilled albuterol HFA inhaler for PRN use.          Relevant Medications    albuterol sulfate  (90 " Base) MCG/ACT inhaler     Other Visit Diagnoses       Encounter for annual health examination    -  Primary    Relevant Medications    Cyanocobalamin (B-12-SL) 1000 MCG sublingual tablet    Other Relevant Orders    Comprehensive Metabolic Panel    Hemoglobin A1c    Urinalysis With Culture If Indicated - Urine, Clean Catch            Summary/Discussion:         Next Appointment with me: Visit date not found    Return in about 6 months (around 6/26/2024) for Reassess chronic medical problems.      HEALTHCARE MAINTENANCE ISSUES     Cancer Screening:  Colon: Initial/Next screening at age: CURRENT and -Colonoscopy  Repeat colon cancer screening: every 10 years  Breast: Recommended monthly self exams; annual professional exam  Mammogram: every 1 year  Cervical: 3 years  Skin: Monthly self skin examination, annual exam by health professional  Lung: Does not meet criteria for lung cancer screening.   Other:    Screening Labs & Tests:  Lab results reviewed & discussed with the patient or test orders placed today.  EKG:  CV Screening: Lipid panel  DEXA (65+ or postmenopausal with risk factors):   HEP C (If born [birth year], or risk factors): Previously had negative screen  Other:     Immunization/Vaccinations (to be given today unless deferred by patient)  Influenza: Patient deferred/declined flu vaccine (recommended annual vaccination)  Hepatitis A: Up to date and Verify immunization records  Hepatitis B: Not needed at this time  Tetanus/Pertussis: Verify immunization records  Pneumococcal: Not needed at this time  Shingles: Not needed at this time  COVID: Does not plan to get the latest booster  RSV: Not indicated    Lifestyle Counseling:  Lifestyle Modifications: Follow a low fat, low cholesterol diet, Improve sleep hygiene, Discussed better management of stress/anxiety, and Discussed sexual issues, safe sex practices, contraception  Safety Issues: Always wear seatbelt, Avoid texting while driving   Use sunscreen, regular  skin examination  Recommended annual dental/vision examination.  Emotional/Stress/Sleep: Reviewed and  given when appropriate      Health Maintenance   Topic Date Due    TDAP/TD VACCINES (1 - Tdap) Never done    HEPATITIS C SCREENING  Never done    ANNUAL PHYSICAL  Never done    COVID-19 Vaccine (4 - 2023-24 season) 12/28/2023 (Originally 9/1/2023)    INFLUENZA VACCINE  03/31/2024 (Originally 8/1/2023)    MAMMOGRAM  11/16/2024    PAP SMEAR  09/26/2026    COLORECTAL CANCER SCREENING  09/06/2032    Pneumococcal Vaccine 0-64  Aged Out           Transcribed from ambient dictation for Lionel Hahn MD by Angy Zepeda.  12/26/23   16:16 EST    Patient or patient representative verbalized consent to the visit recording.  I have personally performed the services described in this document as transcribed by the above individual, and it is both accurate and complete.  Lionel Hahn MD  12/26/2023  16:23 EST

## 2023-12-26 NOTE — ASSESSMENT & PLAN NOTE
CT Chest Without Contrast Diagnostic (05/09/2023 18:59)   1.  Pleural and parenchymal scarring with sub-6 mm micronodules not  significantly changed from 04/15/2021 favoring benign etiology. If this  is a high-risk patient or smoker consider annual follow-up.  2.  Please see above for additional chronic findings.

## 2023-12-26 NOTE — ASSESSMENT & PLAN NOTE
Lab Results   Component Value Date    [id number] 304 08/11/2022      Start B12 1000 mcg SL daily.

## 2023-12-26 NOTE — ASSESSMENT & PLAN NOTE
Outside fasting glucose was 121 recently. Check nonfasting labs today including A1c. I suspect it is isolated and related to alcohol or diet.     Lab Results   Component Value Date    GLUCOSE 97 08/11/2022    GLUCOSE 127 (H) 10/30/2021    GLUCOSE 92 10/28/2021     Lab Results   Component Value Date    HGBA1C 5.4 09/26/2023

## 2023-12-27 LAB
ALBUMIN SERPL-MCNC: 4.5 G/DL (ref 3.5–5.2)
ALBUMIN/GLOB SERPL: 1.7 G/DL
ALP SERPL-CCNC: 23 U/L (ref 39–117)
ALT SERPL-CCNC: 18 U/L (ref 1–33)
APPEARANCE UR: CLEAR
AST SERPL-CCNC: 24 U/L (ref 1–32)
BACTERIA #/AREA URNS HPF: NORMAL /HPF
BILIRUB SERPL-MCNC: 0.3 MG/DL (ref 0–1.2)
BILIRUB UR QL STRIP: NEGATIVE
BUN SERPL-MCNC: 10 MG/DL (ref 6–20)
BUN/CREAT SERPL: 11 (ref 7–25)
CALCIUM SERPL-MCNC: 9.8 MG/DL (ref 8.6–10.5)
CASTS URNS QL MICRO: NORMAL /LPF
CHLORIDE SERPL-SCNC: 102 MMOL/L (ref 98–107)
CO2 SERPL-SCNC: 24.8 MMOL/L (ref 22–29)
COLOR UR: YELLOW
CREAT SERPL-MCNC: 0.91 MG/DL (ref 0.57–1)
EGFRCR SERPLBLD CKD-EPI 2021: 78.5 ML/MIN/1.73
EPI CELLS #/AREA URNS HPF: NORMAL /HPF (ref 0–10)
GLOBULIN SER CALC-MCNC: 2.6 GM/DL
GLUCOSE SERPL-MCNC: 85 MG/DL (ref 65–99)
GLUCOSE UR QL STRIP: NEGATIVE
HBA1C MFR BLD: 5.2 % (ref 4.8–5.6)
HGB UR QL STRIP: NEGATIVE
KETONES UR QL STRIP: NEGATIVE
LEUKOCYTE ESTERASE UR QL STRIP: NEGATIVE
MICRO URNS: NORMAL
MICRO URNS: NORMAL
NITRITE UR QL STRIP: NEGATIVE
PH UR STRIP: 6 [PH] (ref 5–7.5)
POTASSIUM SERPL-SCNC: 3.9 MMOL/L (ref 3.5–5.2)
PROT SERPL-MCNC: 7.1 G/DL (ref 6–8.5)
PROT UR QL STRIP: NEGATIVE
RBC #/AREA URNS HPF: NORMAL /HPF (ref 0–2)
SODIUM SERPL-SCNC: 139 MMOL/L (ref 136–145)
SP GR UR STRIP: 1.01 (ref 1–1.03)
URINALYSIS REFLEX: NORMAL
UROBILINOGEN UR STRIP-MCNC: 0.2 MG/DL (ref 0.2–1)
WBC #/AREA URNS HPF: NORMAL /HPF (ref 0–5)

## 2023-12-28 ENCOUNTER — DOCUMENTATION (OUTPATIENT)
Dept: INTERNAL MEDICINE | Age: 48
End: 2023-12-28
Payer: COMMERCIAL

## 2023-12-28 DIAGNOSIS — E53.8 B12 DEFICIENCY: Primary | ICD-10-CM

## 2024-01-03 DIAGNOSIS — B02.29 POST HERPETIC NEURALGIA: Primary | ICD-10-CM

## 2024-01-03 RX ORDER — PREGABALIN 75 MG/1
75 CAPSULE ORAL 3 TIMES DAILY PRN
Qty: 9 CAPSULE | Refills: 0 | Status: SHIPPED | OUTPATIENT
Start: 2024-01-03 | End: 2024-01-06

## 2024-01-28 NOTE — PATIENT INSTRUCTIONS
PREVENTING BACTERIAL VAGINOSIS      THINGS YOU CAN DO TO PREVENT BACTERIAL VAGINOSIS  Use only gentle soaps.   Avoid douching.  Wear cotton underwear.  Use unscented sanitary pads.  Change pads or tampons frequently during your period.   Use condoms during intercourse.  Take a OTC probiotic daily.  Consider taking a boric acid supplement vaginally.       WHEN TO CALL THE OFFICE  Unusual vaginal discharge  Odor  Burning sensation  Itching  Pain

## 2024-01-28 NOTE — PROGRESS NOTES
"GYN EXAM    Chief Complaint   Patient presents with    Gynecologic Exam     Patient is here for reoccurring yeast infection        SUBJECTIVE:     Sonali is a 48 y.o.  who presents with complaints of vaginal discharge for the last couple of weeks. She describes the discharge as white thick. Denies vaginal itching, odor, any change in soaps or hygiene products, douching, or new partners. Denies pelvic pain or dysuria. She and her partner took a 6 month break during which she was celibate but he was not. She feels after he came back into the relationship that she has had issues with bacterial vaginosis and yeast.     Past Medical History:   Diagnosis Date    Abnormal Pap smear of cervix     Allergic     Allergic rhinitis     Asthma 2020    Covid related    COVID-19 virus infection 2020    Fibroid     Headache 2021    History of medical problems 2021    Uterine fiberoid- D&C    PMS (premenstrual syndrome)     Scoliosis       Past Surgical History:   Procedure Laterality Date    COLONOSCOPY N/A 2022    Procedure: COLONOSCOPY into cecum;  Surgeon: Geo Mendoza MD;  Location: [facility] ENDOSCOPY;  Service: Gastroenterology;  Laterality: N/A;  pre: colitis   post: normal    D & C WITH SUCTION  2021    DILATATION AND CURETTAGE  2007    WISDOM TOOTH EXTRACTION        Review of Systems   Genitourinary:  Positive for vaginal discharge.   All other systems reviewed and are negative.      OBJECTIVE:   Vitals:    24 1426   BP: 103/71   Weight: 62.1 kg (136 lb 12.8 oz)   Height: 180.3 cm (70.98\")        Physical Exam  Constitutional:       General: She is awake.      Appearance: Normal appearance. She is well-developed and well-groomed.   HENT:      Head: Normocephalic and atraumatic.   Pulmonary:      Effort: Pulmonary effort is normal.   Musculoskeletal:      Cervical back: Normal range of motion.   Neurological:      General: No focal deficit present.      Mental Status: She is " alert and oriented to person, place, and time.   Skin:     General: Skin is warm and dry.   Psychiatric:         Mood and Affect: Mood normal.         Behavior: Behavior normal. Behavior is cooperative.   Vitals reviewed.         ASSESSMENT/PLAN  Diagnoses and all orders for this visit:    1. Vaginal discharge (Primary)  -     NuSwab VG+ & HSV  -     Group B Streptococcus Culture - Swab, Vaginal/Rectum  -     Mycoplasma / Ureaplasma Culture - Swab, Cervix        STD screen today- desires - NuSwab., encouraged use of condoms.  Vaginal cultures obtained.   Encouraged condoms with IC, cotton only underwear, mild or no soaps, avoidance of douching or abner steaming.   Encouraged adding probiotic daily or vaginal boric acid suppositories.   Discussed visible discharge consistent with bactial vaginosis - Rx sent for flagyl PO - recommended to avoid alcohol while taking.     Return if symptoms worsen or fail to improve.    I spent 15 minutes caring for Sonali on this date of service. This time includes time spent by me in the following activities: preparing for the visit, reviewing tests, obtaining and/or reviewing a separately obtained history, performing a medically appropriate examination and/or evaluation, counseling and educating the patient/family/caregiver, ordering medications, tests, or procedures, referring and communicating with other health care professionals, documenting information in the medical record, independently interpreting results and communicating that information with the patient/family/caregiver, and care coordination      Cass Vargas CNM  1/29/2024  20:55 EST

## 2024-01-29 ENCOUNTER — OFFICE VISIT (OUTPATIENT)
Dept: OBSTETRICS AND GYNECOLOGY | Facility: CLINIC | Age: 49
End: 2024-01-29
Payer: COMMERCIAL

## 2024-01-29 VITALS
BODY MASS INDEX: 19.15 KG/M2 | SYSTOLIC BLOOD PRESSURE: 103 MMHG | WEIGHT: 136.8 LBS | DIASTOLIC BLOOD PRESSURE: 71 MMHG | HEIGHT: 71 IN

## 2024-01-29 DIAGNOSIS — N89.8 VAGINAL DISCHARGE: Primary | ICD-10-CM

## 2024-01-29 PROCEDURE — 99213 OFFICE O/P EST LOW 20 MIN: CPT

## 2024-01-29 RX ORDER — METRONIDAZOLE 500 MG/1
500 TABLET ORAL 2 TIMES DAILY
Qty: 14 TABLET | Refills: 0 | Status: SHIPPED | OUTPATIENT
Start: 2024-01-29 | End: 2024-02-05

## 2024-02-02 LAB
A VAGINAE DNA VAG QL NAA+PROBE: ABNORMAL SCORE
B-HEM STREP SPEC QL CULT: POSITIVE
BVAB2 DNA VAG QL NAA+PROBE: ABNORMAL SCORE
C ALBICANS DNA VAG QL NAA+PROBE: NEGATIVE
C GLABRATA DNA VAG QL NAA+PROBE: NEGATIVE
C TRACH DNA VAG QL NAA+PROBE: NEGATIVE
HSV1 DNA SPEC QL NAA+PROBE: NEGATIVE
HSV2 DNA SPEC QL NAA+PROBE: NEGATIVE
MEGA1 DNA VAG QL NAA+PROBE: ABNORMAL SCORE
N GONORRHOEA DNA VAG QL NAA+PROBE: NEGATIVE
T VAGINALIS DNA VAG QL NAA+PROBE: NEGATIVE

## 2024-02-02 RX ORDER — PENICILLIN V POTASSIUM 250 MG/1
250 TABLET ORAL 4 TIMES DAILY
Qty: 12 TABLET | Refills: 0 | Status: SHIPPED | OUTPATIENT
Start: 2024-02-02 | End: 2024-02-05

## 2024-02-05 ENCOUNTER — TELEPHONE (OUTPATIENT)
Dept: OBSTETRICS AND GYNECOLOGY | Facility: CLINIC | Age: 49
End: 2024-02-05
Payer: COMMERCIAL

## 2024-02-05 NOTE — TELEPHONE ENCOUNTER
----- Message from Sonali Lantigua sent at 2/5/2024  9:36 AM EST -----  Regarding: BV treatment for partner  Contact: [phone]  Caesar Odell,  My partner and I are refraining from sex until we are both treated (and have completed treatment)for the bacteria that is causing me to have BV. I understand that this is not yet declared a STI by medical professionals,  but I am confident that he has spread it to me after our “break” (had BV twice since then) and if we are not both treated, he will continue to spread it back to me.  He is scheduling an appointment with his Dr. What specifically should he be asking for at his appointment? If they push trying to urine test for the bacteria, should they be testing exclusively for the ones I tested positive for (Atopobium Vaginae & Megasphaera 1)? What medications should he ask for?  Thank you for your through evaluation and assistance during my recent visit.   Sonali

## 2024-02-06 LAB
M HOMINIS SPEC QL CULT: NEGATIVE
U UREALYTICUM SPEC QL CULT: NEGATIVE

## 2024-02-06 RX ORDER — SACCHAROMYCES BOULARDII 250 MG
250 CAPSULE ORAL 2 TIMES DAILY
Qty: 60 CAPSULE | Refills: 1 | OUTPATIENT
Start: 2024-02-06

## 2024-02-06 RX ORDER — BORIC ACID
600 POWDER (GRAM) MISCELLANEOUS NIGHTLY
Qty: 30 SUPPOSITORY | Refills: 0 | Status: SHIPPED | OUTPATIENT
Start: 2024-02-06

## 2024-02-06 RX ORDER — SACCHAROMYCES BOULARDII 250 MG
250 CAPSULE ORAL 2 TIMES DAILY
Qty: 60 CAPSULE | Refills: 5 | Status: SHIPPED | OUTPATIENT
Start: 2024-02-06

## 2024-02-06 RX ORDER — BORIC ACID
600 POWDER (GRAM) MISCELLANEOUS NIGHTLY
Qty: 30 SUPPOSITORY | Refills: 0 | Status: SHIPPED | OUTPATIENT
Start: 2024-02-06 | End: 2024-02-06 | Stop reason: SDUPTHER

## 2024-02-06 RX ORDER — BORIC ACID
600 POWDER (GRAM) MISCELLANEOUS NIGHTLY
Qty: 30 SUPPOSITORY | Refills: 0 | OUTPATIENT
Start: 2024-02-06 | End: 2024-03-07

## 2024-04-02 ENCOUNTER — HOSPITAL ENCOUNTER (OUTPATIENT)
Facility: HOSPITAL | Age: 49
Discharge: HOME | End: 2024-04-02
Admitting: INTERNAL MEDICINE
Payer: COMMERCIAL

## 2024-04-02 ENCOUNTER — OFFICE VISIT (OUTPATIENT)
Dept: INTERNAL MEDICINE | Age: 49
End: 2024-04-02
Payer: COMMERCIAL

## 2024-04-02 VITALS
DIASTOLIC BLOOD PRESSURE: 100 MMHG | WEIGHT: 136 LBS | HEART RATE: 82 BPM | OXYGEN SATURATION: 99 % | BODY MASS INDEX: 19.04 KG/M2 | TEMPERATURE: 96.9 F | SYSTOLIC BLOOD PRESSURE: 140 MMHG | HEIGHT: 71 IN

## 2024-04-02 DIAGNOSIS — M79.674 CHRONIC TOE PAIN, BILATERAL: ICD-10-CM

## 2024-04-02 DIAGNOSIS — G89.29 CHRONIC TOE PAIN, BILATERAL: ICD-10-CM

## 2024-04-02 DIAGNOSIS — M79.675 CHRONIC TOE PAIN, BILATERAL: ICD-10-CM

## 2024-04-02 DIAGNOSIS — R20.2 PARESTHESIAS IN LEFT HAND: ICD-10-CM

## 2024-04-02 DIAGNOSIS — L60.1 ONYCHOLYSIS OF TOENAIL: ICD-10-CM

## 2024-04-02 DIAGNOSIS — M54.42 CHRONIC LEFT-SIDED LOW BACK PAIN WITH LEFT-SIDED SCIATICA: Primary | ICD-10-CM

## 2024-04-02 DIAGNOSIS — G89.29 CHRONIC LEFT-SIDED LOW BACK PAIN WITH LEFT-SIDED SCIATICA: Primary | ICD-10-CM

## 2024-04-02 PROCEDURE — 99214 OFFICE O/P EST MOD 30 MIN: CPT | Performed by: INTERNAL MEDICINE

## 2024-04-02 PROCEDURE — 72110 X-RAY EXAM L-2 SPINE 4/>VWS: CPT

## 2024-04-02 NOTE — PROGRESS NOTES
I N T E R N A L  M E D I C I N E  J U N O H  K I M,  M D      ENCOUNTER DATE:  04/02/2024    Sonali Lantigua / 48 y.o. / female      CHIEF COMPLAINT / REASON FOR OFFICE VISIT     Tingling (Lower back radiate to feet , hand(finger ))      ASSESSMENT & PLAN     Problem List Items Addressed This Visit    None  Visit Diagnoses       Chronic left-sided low back pain with left-sided sciatica    -  Primary    Relevant Orders    XR Spine Lumbar Complete 4+VW    Ambulatory Referral to Physical Therapy Evaluate and treat (Completed)          Orders Placed This Encounter   Procedures    XR Spine Lumbar Complete 4+VW    Ambulatory Referral to Physical Therapy Evaluate and treat     No orders of the defined types were placed in this encounter.      SUMMARY/DISCUSSION  The patient presents for evaluation of paresthesia and back pain.    Back pain  In the third week of 01/2024, the patient bent forward to pick something up and experienced pain when she farooq back to standing. She continued to experience severe pain throughout that week, including when she was lying in bed. She attended physical therapy for 1 week while she was in [place]. She was advised that her pain was likely caused by a slipped disc and associated radiculopathy. She has noted moderate improvement since onset of injury, with occasional bouts of unpredictable pain. She initially localized the pain to her left lower back and it has since been present in her SI joint, with occasional radiation down the posterior aspect of her left lower extremity, heel, and shin. She has constant, sharp pain, with an occasional stabbing sensation. Initially, the pain was exacerbated upon standing, but has since improved. No imaging has been obtained or medication prescribed. She was advised to rest and refrain from lifting objects or bending over. She also reports intermittent weakness in her left lower extremity, with an occasional sensation that she is dragging  "it.    Paresthesia  The patient is right-handed and reports intermittent paresthesia in her left hand, which occurs both at rest and while ambulating. She describes a decreased sensation behind her fingertips. She does not experience weakness in her  or shooting pain into her fingertips. She denies any cervical pain, but occasionally experiences shooting pain across her chest bone on the left side.    Bilateral great toe injury  The patient sustained an injury to her bilateral great toes when she dropped objects on her feet, and her toenails are peeling off the nail bed. She has been using a clipped laser treatment. Following the removal of her gel polish, the top third of her nail was detached and the nail bed was peeling off. Her job requires prolonged standing, which exacerbates her foot pain.    Shingles  The patient had shingles in 01/2024 on the left side of her back. She has scars from the shingles rash. She had extreme pain for 6 to 7 days. The antiviral medication helped clear the blisters up quickly.     Current medications  She takes trazodone as needed, a probiotic, and nasal spray as needed. She does not take omega-3 fish oil, biotin, albuterol, vitamin B12, and Astelin. She is not taking any allergy medication.      Next Appointment with me: Visit date not found    No follow-ups on file.      VITAL SIGNS     Vitals:    04/02/24 0951   BP: 140/100   Pulse: 82   Temp: 96.9 °F (36.1 °C)   SpO2: 99%   Weight: 61.7 kg (136 lb)   Height: 180.3 cm (70.98\")       @BP@  Wt Readings from Last 3 Encounters:   04/02/24 61.7 kg (136 lb)   01/29/24 62.1 kg (136 lb 12.8 oz)   12/31/23 62.6 kg (138 lb)     Body mass index is 18.98 kg/m².    [unfilled]      MEDICATIONS AT THE TIME OF OFFICE VISIT     Current Outpatient Medications on File Prior to Visit   Medication Sig    azelastine (ASTELIN) 0.1 % nasal spray 2 sprays into the nostril(s) as directed by provider 2 (Two) Times a Day. Use in each nostril as directed    " "Boric Acid suppository Suppository Insert 1 suppository into the vagina Every Night.    Cyanocobalamin (B-12-SL) 1000 MCG sublingual tablet Place 1 tablet under the tongue Daily.    Fluticasone Propionate (FLONASE ALLERGY RELIEF NA)     Multiple Vitamins-Minerals (WOMENS MULTI GUMMIES PO)     Probiotic Product (Culturelle Probiotics) chewable tablet     traZODone (DESYREL) 50 MG tablet Take 1 tablet by mouth At Night As Needed for Sleep.    [DISCONTINUED] albuterol sulfate  (90 Base) MCG/ACT inhaler Inhale 2 puffs Every 4 (Four) Hours As Needed for Wheezing. (Patient not taking: Reported on 4/2/2024)    [DISCONTINUED] Biotin 77491 MCG tablet  (Patient not taking: Reported on 4/2/2024)    [DISCONTINUED] Omega-3 Fatty Acids (fish oil) 1000 MG capsule capsule Take 1 capsule by mouth Daily. (Patient not taking: Reported on 4/2/2024)    [DISCONTINUED] saccharomyces boulardii (Florastor) 250 MG capsule Take 1 capsule by mouth 2 (Two) Times a Day. (Patient not taking: Reported on 4/2/2024)     No current facility-administered medications on file prior to visit.          HISTORY OF PRESENT ILLNESS     ***[facility]      Patient Care Team:  Lionel Hahn MD as PCP - General (Internal Medicine)  Charisse Waller PA as Physician Assistant (Obstetrics and Gynecology)    REVIEW OF SYSTEMS     Review of Systems     PHYSICAL EXAMINATION     Physical Exam        REVIEWED DATA     Labs:     [unfilled]        SILVIA QDS (below this line)        Transcribed from ambient dictation for Lionel Hahn MD by Tisha David.  04/02/24   12:04 EDT    {SILVIA Provider Statement:[id number]::\"Patient or patient representative verbalized consent to the visit recording.\",\"I have personally performed the services described in this document as transcribed by the above individual, and it is both accurate and complete.\"}     "

## 2024-04-03 DIAGNOSIS — F51.01 PRIMARY INSOMNIA: Chronic | ICD-10-CM

## 2024-04-03 RX ORDER — TRAZODONE HYDROCHLORIDE 50 MG/1
50 TABLET ORAL NIGHTLY PRN
Qty: 30 TABLET | Refills: 5 | Status: SHIPPED | OUTPATIENT
Start: 2024-04-03

## 2024-04-08 DIAGNOSIS — G89.29 CHRONIC LEFT-SIDED LOW BACK PAIN WITH LEFT-SIDED SCIATICA: Primary | ICD-10-CM

## 2024-04-08 DIAGNOSIS — M47.26 OSTEOARTHRITIS OF SPINE WITH RADICULOPATHY, LUMBAR REGION: ICD-10-CM

## 2024-04-08 DIAGNOSIS — M54.42 CHRONIC LEFT-SIDED LOW BACK PAIN WITH LEFT-SIDED SCIATICA: Primary | ICD-10-CM

## 2024-05-13 ENCOUNTER — TELEPHONE (OUTPATIENT)
Dept: GASTROENTEROLOGY | Facility: CLINIC | Age: 49
End: 2024-05-13
Payer: COMMERCIAL

## 2024-05-13 NOTE — TELEPHONE ENCOUNTER
Returned phone call to patient.   She states in 12/2023, she had shingles and travels extensively. She states she started with nausea, loose stools and abdominal cramping. She states the symptoms would happen about every 4 weeks and last about 4-5 days. In the past 5 weeks she states the symptoms have become more frequent and lasting longer. She states she is into 5 days with the issues.     Reports diminished appetite, denies any fever or blood. She does have hemorrhoids and has had an occasional bright red blood pass when her hemorrhoids flare.     Reports bloating and gas. She reports in the past couple of weeks she has had fecal urgency.     Update to Dr. Mendoza.

## 2024-05-13 NOTE — TELEPHONE ENCOUNTER
Caller: Sonali Lantigua     Relationship: [unfilled]     Best call back number:[phone]    What is your medical concern? PATIENT IS HAVING LOOSE STOOLS, CRAMPING AND NAUSEA. SHE IS VERY CONCERNED AND WOULD LIKE TO TALK TO SOMEONE ON THE CLINICAL. I CAN'T GET HER IN UNTIL AUG AND SHE FEELS LIKE SHE CAN'T WAIT THAT LONG.     How long has this issue been going on? SINCE LAST DEC AND KEEPS GETTING WORSE.     Is your provider already aware of this issue? NO    Have you been treated for this issue? NO

## 2024-05-15 ENCOUNTER — TELEPHONE (OUTPATIENT)
Dept: GASTROENTEROLOGY | Facility: CLINIC | Age: 49
End: 2024-05-15
Payer: COMMERCIAL

## 2024-05-15 DIAGNOSIS — R10.9 ABDOMINAL PAIN, UNSPECIFIED ABDOMINAL LOCATION: Primary | ICD-10-CM

## 2024-05-15 DIAGNOSIS — R19.7 DIARRHEA, UNSPECIFIED TYPE: ICD-10-CM

## 2024-05-15 NOTE — TELEPHONE ENCOUNTER
Per Dr. Mendoza:       Recommend she submit a stool sample for GI PCR and fecal calprotectin.  Please prescribe Bentyl 20mg QID PRN #60 with 1 refill.

## 2024-05-16 NOTE — TELEPHONE ENCOUNTER
Hub staff attempted to follow warm transfer process and was unsuccessful     Caller: Sonali Lantigua    Relationship to patient: Self    Best call back number: [phone]    Patient is needing: PT RETURNED CALL TO RACHEL ORIGINAL MESSAGE 5/13/24.    PT GAVE PERMISSION TO COMMUNICATION VIA [employer].

## 2024-05-17 RX ORDER — DICYCLOMINE HCL 20 MG
20 TABLET ORAL 4 TIMES DAILY PRN
Qty: 60 TABLET | Refills: 1 | Status: SHIPPED | OUTPATIENT
Start: 2024-05-17

## 2024-05-17 NOTE — TELEPHONE ENCOUNTER
Sent pt MyChart msg advising of  recommendations. Advised to call if any questions.      Orders for lab entered, rx e-scribed, sent to Dr Mendoza to cosign.

## 2024-08-06 ENCOUNTER — TELEPHONE (OUTPATIENT)
Dept: GASTROENTEROLOGY | Facility: CLINIC | Age: 49
End: 2024-08-06
Payer: COMMERCIAL

## 2024-08-06 NOTE — TELEPHONE ENCOUNTER
Patient called. Advised as per Dr. Leigh's note. She verb understanding.     She states the delay in turning in her stool specimen is because of her job. She travels for her job, most recently she has been in [place] and [place].     She states she was not feeling well when she turned in her specimen but is feeling better now.     Advised an update will be sent to Dr. Mendoza.

## 2024-08-06 NOTE — TELEPHONE ENCOUNTER
----- Message from Geo Mendoza sent at 8/5/2024  7:11 AM EDT -----  Positive for a type of E coli that usually only results in mild self limited sx.  We ordered this PCR 3 mos ago, can we get an update on what is going on with her

## 2024-08-09 ENCOUNTER — TELEPHONE (OUTPATIENT)
Dept: INTERNAL MEDICINE | Age: 49
End: 2024-08-09
Payer: COMMERCIAL

## 2024-08-09 NOTE — TELEPHONE ENCOUNTER
Patient want to see when her last tetanus was and to discuss other immunizations she may need to go out of the country.  Please call back and advise.

## 2024-08-14 ENCOUNTER — TELEPHONE (OUTPATIENT)
Dept: GASTROENTEROLOGY | Facility: CLINIC | Age: 49
End: 2024-08-14
Payer: COMMERCIAL

## 2024-08-14 DIAGNOSIS — K52.9 COLITIS: Primary | ICD-10-CM

## 2024-08-14 NOTE — TELEPHONE ENCOUNTER
Geo Mendoza MD  [facility]  Slight elevation of FCP.  If this was done when she had E coli infection this is probably the cause.   I recommend we repeat again in 6 weeks.  Please make office f/u with SM in 6 weeks, we may need to discuss using prophylactic antibiotics when she travels.

## 2024-09-12 NOTE — PROGRESS NOTES
"    I N T E R N A L  M E D I C I N E    J U N O H  K I M,  M D      ENCOUNTER DATE:  04/02/2024    Sonali Lantigua / 48 y.o. / female    CHIEF COMPLAINT / REASON FOR OFFICE VISIT     Tingling (Lower back radiate to feet , hand(finger ))      ASSESSMENT & PLAN     1. Chronic left-sided low back pain with left-sided sciatica    2. Paresthesias in left hand    3. Chronic toe pain, bilateral    4. Onycholysis of toenail         Orders Placed This Encounter   Procedures    XR Spine Lumbar Complete 4+VW    Ambulatory Referral to Physical Therapy Evaluate and treat     No orders of the defined types were placed in this encounter.      SUMMARY/DISCUSSION  Start PT (referral placed). If left side back pain / radiculopathy symptoms do not improve, will proceed with MRI lumbar spine.   Left hand/finger intermittent paresthesias. Probable entrapment. Modify activities. Consider carpal tunnel splint at night. If worsening we can consider NV.   Onycholysis of toenails.  Modified shoe wear for big toe pain.  Consider seeing podiatry if symptoms worsen.  She defers referral at this time.  She will let me know if she would like to proceed in the future.      Next Appointment with me: Visit date not found    No follow-ups on file.      VITAL SIGNS     Vitals:    04/02/24 0951   BP: 140/100   Pulse: 82   Temp: 96.9 °F (36.1 °C)   SpO2: 99%   Weight: 61.7 kg (136 lb)   Height: 180.3 cm (70.98\")       BP Readings from Last 3 Encounters:   04/02/24 140/100   01/29/24 103/71   12/31/23 123/85     Wt Readings from Last 3 Encounters:   04/02/24 61.7 kg (136 lb)   01/29/24 62.1 kg (136 lb 12.8 oz)   12/31/23 62.6 kg (138 lb)     Body mass index is 18.98 kg/m².    Blood pressure readings recorded on patient flowsheet:       No data to display                  MEDICATIONS AT THE TIME OF OFFICE VISIT     Current Outpatient Medications on File Prior to Visit   Medication Sig    azelastine (ASTELIN) 0.1 % nasal spray 2 sprays into the nostril(s) as " directed by provider 2 (Two) Times a Day. Use in each nostril as directed    Boric Acid suppository Suppository Insert 1 suppository into the vagina Every Night.    Cyanocobalamin (B-12-SL) 1000 MCG sublingual tablet Place 1 tablet under the tongue Daily.    Fluticasone Propionate (FLONASE ALLERGY RELIEF NA)     Multiple Vitamins-Minerals (WOMENS MULTI GUMMIES PO)     Probiotic Product (Culturelle Probiotics) chewable tablet     traZODone (DESYREL) 50 MG tablet Take 1 tablet by mouth At Night As Needed for Sleep.    [DISCONTINUED] albuterol sulfate  (90 Base) MCG/ACT inhaler Inhale 2 puffs Every 4 (Four) Hours As Needed for Wheezing. (Patient not taking: Reported on 4/2/2024)    [DISCONTINUED] Biotin 82374 MCG tablet  (Patient not taking: Reported on 4/2/2024)    [DISCONTINUED] Omega-3 Fatty Acids (fish oil) 1000 MG capsule capsule Take 1 capsule by mouth Daily. (Patient not taking: Reported on 4/2/2024)    [DISCONTINUED] saccharomyces boulardii (Florastor) 250 MG capsule Take 1 capsule by mouth 2 (Two) Times a Day. (Patient not taking: Reported on 4/2/2024)     No current facility-administered medications on file prior to visit.          HISTORY OF PRESENT ILLNESS     Back pain  In the third week of 01/2024, the patient bent forward to pick something up and experienced pain when she farooq back to standing. She continued to experience severe pain throughout that week, including when she was lying in bed. She attended physical therapy for 1 week while she was in [facility]. She was advised that her pain was likely caused by a slipped disc and associated radiculopathy. She has noted moderate improvement since onset of injury, with occasional bouts of unpredictable pain. She initially localized the pain to her left lower back and it has since been present in her SI joint, with occasional radiation down the posterior aspect of her left lower extremity, heel, and shin. She has constant, sharp pain, with an occasional  stabbing sensation. Initially, the pain was exacerbated upon standing, but has since improved. No imaging has been obtained or medication prescribed. She was advised to rest and refrain from lifting objects or bending over. She also reports intermittent weakness in her left lower extremity, with an occasional sensation that she is dragging it.     Paresthesia  The patient is right-handed and reports intermittent paresthesia in her left hand, which occurs both at rest and while ambulating. She describes a decreased sensation behind her fingertips. She does not experience weakness in her  or shooting pain into her fingertips. She denies any cervical pain, but occasionally experiences shooting pain across her chest bone on the left side.     Bilateral great toe injury  The patient sustained an injury to her bilateral great toes when she dropped objects on her feet, and her toenails are peeling off the nail bed. She has been using a clipped laser treatment. Following the removal of her gel polish, the top third of her nail was detached and the nail bed was peeling off. Her job requires prolonged standing, which exacerbates her foot pain.         Patient Care Team:  Lionel Hahn MD as PCP - General (Internal Medicine)  Charisse Waller PA as Physician Assistant (Obstetrics and Gynecology)    REVIEW OF SYSTEMS     Review of Systems       PHYSICAL EXAMINATION     Physical Exam  Alert with normal thought and judgment.   No acute distress   Strength bilateral lower extremities: right side is 5/5 ; left side 5-  DTR: bilateral patellar 2+; achilles right side 2+; left side 1+ ; mild + SLR on left side  MuSk: bilateral hips with normal ROM without pain   Gait: normal   Strength BUE: normal       REVIEWED DATA     Labs:     Lab Results   Component Value Date     12/26/2023    K 3.9 12/26/2023    CALCIUM 9.8 12/26/2023    AST 24 12/26/2023    ALT 18 12/26/2023    BUN 10 12/26/2023    CREATININE 0.91 12/26/2023     "CREATININE 0.84 08/11/2022    CREATININE 0.55 (L) 10/30/2021    EGFRRESULT 78.5 12/26/2023       Lab Results   Component Value Date    HGBA1C 5.20 12/26/2023    HGBA1C 5.4 09/26/2023       Lab Results   Component Value Date     (H) 06/06/2018    HDL 68 06/06/2018    TRIG 106 06/06/2018       Lab Results   Component Value Date    TSH 1.120 09/26/2023    TSH 1.030 08/11/2022    TSH 1.480 01/14/2021    FREET4 0.81 (L) 08/11/2022    FREET4 1.15 01/14/2021    FREET4 1.10 06/06/2018       Lab Results   Component Value Date    WBC 8.5 09/26/2023    HGB 14.2 09/26/2023     09/26/2023       No results found for: \"MALBCRERATIO\"        Imaging:           Medical Tests:           Summary of old records / correspondence / consultant report:           Request outside records:               " Cellulitis

## 2024-09-26 ENCOUNTER — TELEPHONE (OUTPATIENT)
Dept: GASTROENTEROLOGY | Facility: CLINIC | Age: 49
End: 2024-09-26
Payer: COMMERCIAL

## 2024-10-07 ENCOUNTER — OFFICE VISIT (OUTPATIENT)
Dept: INTERNAL MEDICINE | Age: 49
End: 2024-10-07
Payer: COMMERCIAL

## 2024-10-07 VITALS
HEART RATE: 110 BPM | BODY MASS INDEX: 18.9 KG/M2 | OXYGEN SATURATION: 100 % | TEMPERATURE: 97.1 F | HEIGHT: 71 IN | SYSTOLIC BLOOD PRESSURE: 112 MMHG | DIASTOLIC BLOOD PRESSURE: 80 MMHG | WEIGHT: 135 LBS

## 2024-10-07 DIAGNOSIS — J01.90 ACUTE SINUSITIS, RECURRENCE NOT SPECIFIED, UNSPECIFIED LOCATION: Primary | ICD-10-CM

## 2024-10-07 PROCEDURE — 99213 OFFICE O/P EST LOW 20 MIN: CPT | Performed by: INTERNAL MEDICINE

## 2024-10-07 RX ORDER — PREDNISOLONE ACETATE 10 MG/ML
SUSPENSION/ DROPS OPHTHALMIC
COMMUNITY
Start: 2024-09-18

## 2024-10-07 RX ORDER — AZITHROMYCIN 250 MG/1
TABLET, FILM COATED ORAL
Qty: 6 TABLET | Refills: 0 | Status: SHIPPED | OUTPATIENT
Start: 2024-10-07 | End: 2024-10-12

## 2024-10-07 RX ORDER — ALBUTEROL SULFATE 90 UG/1
2 INHALANT RESPIRATORY (INHALATION) EVERY 4 HOURS PRN
Qty: 8 G | Refills: 2 | Status: SHIPPED | OUTPATIENT
Start: 2024-10-07

## 2024-10-07 RX ORDER — BIOTIN 10000 MCG
CAPSULE ORAL
COMMUNITY

## 2024-10-07 NOTE — PROGRESS NOTES
"                             J  U  N  O  H    K  I  M ,   M  D                  I  N  T  E  R  N  A  L    M  E  D  I  C  I  N  E         ENCOUNTER DATE:  10/07/2024    Sonali Lantigua / 49 y.o. / female    OFFICE VISIT ENCOUNTER       CHIEF COMPLAINT / REASON FOR OFFICE VISIT     Sinus Problem (EAR ACHE 3 WEEKS . WAS IN [place]/)      ASSESSMENT & PLAN     1. Acute sinusitis, recurrence not specified, unspecified location      No orders of the defined types were placed in this encounter.    New Medications Ordered This Visit   Medications    azithromycin (ZITHROMAX) 250 MG tablet     Sig: Take 2 tablets the first day, then 1 tablet daily for 4 days. (Take with food)     Dispense:  6 tablet     Refill:  0    albuterol sulfate  (90 Base) MCG/ACT inhaler     Sig: Inhale 2 puffs Every 4 (Four) Hours As Needed for Wheezing or Shortness of Air.     Dispense:  8 g     Refill:  2       SUMMARY/DISCUSSION  Azithromycin for 5 days   Mucinex BID   Simply Saline sinue irrigation twice daily.   She was instructed to call or return if the condition is not improving or worsening and she expressed understanding and agreed to follow above instructions.        TOTAL TIME OF ENCOUNTER:        Next Appointment with me: Visit date not found    No follow-ups on file.      VITAL SIGNS     Vitals:    10/07/24 1056   BP: 112/80   Pulse: 110   Temp: 97.1 °F (36.2 °C)   SpO2: 100%   Weight: 61.2 kg (135 lb)   Height: 180.3 cm (70.98\")       BP Readings from Last 3 Encounters:   10/07/24 112/80   04/02/24 140/100   01/29/24 103/71     Wt Readings from Last 3 Encounters:   10/07/24 61.2 kg (135 lb)   04/02/24 61.7 kg (136 lb)   01/29/24 62.1 kg (136 lb 12.8 oz)     Body mass index is 18.84 kg/m².    Blood pressure readings recorded on patient flowsheet:       No data to display                MEDICATIONS AT THE TIME OF OFFICE VISIT     Current Outpatient Medications on File Prior to Visit   Medication Sig    azelastine (ASTELIN) 0.1 % nasal " spray 2 sprays into the nostril(s) as directed by provider 2 (Two) Times a Day. Use in each nostril as directed    Biotin ([facility] Biotin) 10 MG capsule Take  by mouth.    COLLAGEN PO Take  by mouth.    Cyanocobalamin (B-12-SL) 1000 MCG sublingual tablet Place 1 tablet under the tongue Daily.    Fluticasone Propionate (FLONASE ALLERGY RELIEF NA)     Multiple Vitamins-Minerals (WOMENS MULTI GUMMIES PO)     prednisoLONE acetate (PRED FORTE) 1 % ophthalmic suspension INSTIL 1 DROP INTO RIGHT EYE EVERY 2 HOURS EHILE AWAK THEN TAPER AS DIRECTED    Probiotic Product (Culturelle Probiotics) chewable tablet     Boric Acid suppository Suppository Insert 1 suppository into the vagina Every Night. (Patient not taking: Reported on 10/7/2024)    dicyclomine (BENTYL) 20 MG tablet Take 1 tablet by mouth 4 (Four) Times a Day As Needed for Abdominal Cramping. (Patient not taking: Reported on 10/7/2024)    traZODone (DESYREL) 50 MG tablet TAKE 1 TABLET BY MOUTH AT NIGHT AS NEEDED FOR SLEEP (Patient not taking: Reported on 10/7/2024)     No current facility-administered medications on file prior to visit.          HISTORY OF PRESENT ILLNESS     EYE inflammation 3 weeks ago, given drops   Sinus congestion, pressure, took Mucinex D and Benadryl with some help    Now with sinus pressure pain, no fever, no purulent sinus/nasal discharge.     Taking Benadryl in the evening, Flonase 1 spray BID     Requests refill for albuterol HFA inhaler.     REVIEW OF SYSTEMS     No fever or chills  No increased cough or shortness of breath         PHYSICAL EXAMINATION     Physical Exam  No acute distress   No injection of the eye   Frontal and right maxillary sinus tenderness to palpation   No cervical lymphadenopathy   TM's full without erythema   Oropharynx is clear       REVIEWED DATA     Labs:     Lab Results   Component Value Date     12/26/2023    K 3.9 12/26/2023    CALCIUM 9.8 12/26/2023    AST 24 12/26/2023    ALT 18 12/26/2023    BUN 10  "12/26/2023    CREATININE 0.91 12/26/2023    CREATININE 0.84 08/11/2022    CREATININE 0.55 (L) 10/30/2021    EGFRRESULT 78.5 12/26/2023     Lab Results   Component Value Date    HGBA1C 5.20 12/26/2023    HGBA1C 5.4 09/26/2023     Lab Results   Component Value Date     (H) 06/06/2018    HDL 68 06/06/2018    TRIG 106 06/06/2018     Lab Results   Component Value Date    TSH 1.120 09/26/2023    TSH 1.030 08/11/2022    TSH 1.480 01/14/2021    FREET4 0.81 (L) 08/11/2022    FREET4 1.15 01/14/2021    FREET4 1.10 06/06/2018     Lab Results   Component Value Date    WBC 8.5 09/26/2023    HGB 14.2 09/26/2023     09/26/2023   No results found for: \"MALBCRERATIO\"      Imaging:           Medical Tests:           Summary of old records / correspondence / consultant report:           Request outside records:         "

## 2024-10-08 ENCOUNTER — PATIENT MESSAGE (OUTPATIENT)
Dept: INTERNAL MEDICINE | Age: 49
End: 2024-10-08
Payer: COMMERCIAL

## 2024-10-08 DIAGNOSIS — J01.90 ACUTE SINUSITIS, RECURRENCE NOT SPECIFIED, UNSPECIFIED LOCATION: Primary | ICD-10-CM

## 2024-10-10 ENCOUNTER — TELEPHONE (OUTPATIENT)
Dept: INTERNAL MEDICINE | Age: 49
End: 2024-10-10

## 2024-10-10 RX ORDER — METHYLPREDNISOLONE 4 MG
TABLET, DOSE PACK ORAL
Qty: 21 TABLET | Refills: 0 | Status: SHIPPED | OUTPATIENT
Start: 2024-10-10

## 2024-10-10 NOTE — TELEPHONE ENCOUNTER
Caller: Sonali Lantigua    Relationship: Self    Best call back number: [phone]     Who are you requesting to speak with (clinical staff, provider,  specific staff member): DR PETERS OR MA    What was the call regarding: PATIENT WOULD LIKE TO FOLLOW UP ON STEROID PRESCRIPTION, AND STATES THAT ANTIBIOTICS ARE NOT IMPROVING HER CONDITION. PLEASE CALL AND ADVISE

## 2024-12-06 ENCOUNTER — OFFICE VISIT (OUTPATIENT)
Dept: GASTROENTEROLOGY | Facility: CLINIC | Age: 49
End: 2024-12-06
Payer: COMMERCIAL

## 2024-12-06 VITALS
DIASTOLIC BLOOD PRESSURE: 83 MMHG | HEART RATE: 92 BPM | TEMPERATURE: 97.8 F | SYSTOLIC BLOOD PRESSURE: 118 MMHG | HEIGHT: 70 IN | BODY MASS INDEX: 19.61 KG/M2 | WEIGHT: 137 LBS

## 2024-12-06 DIAGNOSIS — A09 TRAVELER'S DIARRHEA: ICD-10-CM

## 2024-12-06 DIAGNOSIS — R10.11 RIGHT UPPER QUADRANT ABDOMINAL PAIN: Primary | ICD-10-CM

## 2024-12-06 NOTE — PROGRESS NOTES
"Chief Complaint  Abdominal Pain and Nausea    Subjective          History of Present Illness    Sonali Lantigua is a  49 y.o. female presents for evaluation of abdominal pain and nausea.  She is a patient Dr. Mendoza last seen in 2022 for infectious colitis.  She is new to me.    She started having alternating bowel habits with blood at end of stool starting January 2024. Lasted 6 months then resolved. Had work up below but feels this was incidental due to travel to [place].     She had GI PCR and fecal calprotectin on 8/1/2024 which showed enteroaggregative E. coli.  Fecal calprotectin elevated at 138.  Not treated with antibiotics due to self-limited nature.  9/21/2024 fecal calprotectin (7) showed resolution inflammation. She also reports right eye pain with inflammation during this time--received steroid whch resolved inflammation but still with pain intermittently. Heart rate drops between 40-60 on her watch during the eye pain episodes. Feels dizzy/unwell during those times prompting her to check her watch. Now with right eye auras--not associated with pain. Planning to see Neurology.     Now with episode of RUQ pain, last bad for 8 hours. Has had milder intermittent episodes. Associated with nausea. Could not relate to PO intake. Denies fever, vomiting,melena, hematochezia.     9/6/2022 colonoscopy showed unremarkable.  No evidence of colitis.  Recall 10 years.    Objective   Vital Signs:   /83   Pulse 92   Temp 97.8 °F (36.6 °C)   Ht 177.8 cm (70\")   Wt 62.1 kg (137 lb)   BMI 19.66 kg/m²       Physical Exam  Vitals reviewed.   Constitutional:       General: She is awake. She is not in acute distress.     Appearance: Normal appearance. She is well-developed and well-groomed.   HENT:      Head: Normocephalic.   Pulmonary:      Effort: Pulmonary effort is normal. No respiratory distress.   Abdominal:      General: Abdomen is flat. Bowel sounds are normal. There is no distension.      Palpations: Abdomen " is soft. There is no hepatomegaly, splenomegaly or mass.      Tenderness:  in the right upper quadrant There is guarding. There is no rebound. Positive signs include Solano's sign.   Skin:     Coloration: Skin is not pale.   Neurological:      Mental Status: She is alert and oriented to person, place, and time.      Gait: Gait is intact.   Psychiatric:         Mood and Affect: Mood and affect normal.         Speech: Speech normal.         Behavior: Behavior is cooperative.         Judgment: Judgment normal.          Result Review :             Assessment and Plan    Diagnoses and all orders for this visit:    1. Right upper quadrant abdominal pain (Primary)  -     US Gallbladder    2. Traveler's diarrhea    Recommend proceeding with gallbladder US, suspect biliary dyskinesia.  Positive Solano sign.    Would recommend prophylactic antibiotics when she travels.  Gave her samples of Xifaxan 550 mg to take twice daily for 3 days if she is traveling and has diarrhea.  [place].    Follow Up   Return for Follow-up based on test results.    Dragon dictation used throughout this note.     Mare Solano PA-C

## 2024-12-13 ENCOUNTER — HOSPITAL ENCOUNTER (OUTPATIENT)
Dept: ULTRASOUND IMAGING | Facility: HOSPITAL | Age: 49
Discharge: HOME | End: 2024-12-13
Admitting: PHYSICIAN ASSISTANT
Payer: COMMERCIAL

## 2024-12-13 PROCEDURE — 76705 ECHO EXAM OF ABDOMEN: CPT

## 2024-12-19 ENCOUNTER — TELEPHONE (OUTPATIENT)
Dept: GASTROENTEROLOGY | Facility: CLINIC | Age: 49
End: 2024-12-19
Payer: COMMERCIAL

## 2024-12-19 NOTE — TELEPHONE ENCOUNTER
Message from patient via my chart:     Good morning Mare,     I’m following up on the results of the gallbladder ultrasound. I have tried called the office multiple times, but the calls went un answered. There is nothing in my chart either.  Tomorrow is a week post ultrasound and I’m still having a variety of non severe symptoms.      Thank you

## 2024-12-19 NOTE — TELEPHONE ENCOUNTER
Patient called. No answer. Left message advised the radiologist has not read her u/s results yet. Advised it was requested the results be read stat.   Once the radiologist has given his interpretation and Mare reviews it, she will receive a phone call with the results.     Update to Mare.

## 2024-12-19 NOTE — TELEPHONE ENCOUNTER
Call placed to the ultrasound department. She states the patient had the u/s done on 12/13 but the radiologist has not read it yet.

## 2025-02-18 ENCOUNTER — OFFICE VISIT (OUTPATIENT)
Dept: OBSTETRICS AND GYNECOLOGY | Facility: CLINIC | Age: 50
End: 2025-02-18
Payer: COMMERCIAL

## 2025-02-18 VITALS
BODY MASS INDEX: 19.61 KG/M2 | SYSTOLIC BLOOD PRESSURE: 119 MMHG | DIASTOLIC BLOOD PRESSURE: 81 MMHG | WEIGHT: 137 LBS | HEIGHT: 70 IN

## 2025-02-18 DIAGNOSIS — R61 NIGHT SWEATS: ICD-10-CM

## 2025-02-18 DIAGNOSIS — R23.2 HOT FLASHES: Primary | ICD-10-CM

## 2025-02-19 LAB
ESTRADIOL SERPL-MCNC: 72.4 PG/ML
FSH SERPL-ACNC: 29.4 MIU/ML
FT4I SERPL CALC-MCNC: 1.6 (ref 1.2–4.9)
HCT VFR BLD AUTO: 41.3 % (ref 34–46.6)
HGB BLD-MCNC: 13.7 G/DL (ref 11.1–15.9)
LH SERPL-ACNC: 34.1 MIU/ML
PROGEST SERPL-MCNC: 5.5 NG/ML
T3RU NFR SERPL: 23 % (ref 24–39)
T4 SERPL-MCNC: 6.8 UG/DL (ref 4.5–12)
TESTOST SERPL-MCNC: <3 NG/DL (ref 4–50)
TSH SERPL DL<=0.005 MIU/L-ACNC: 1.56 UIU/ML (ref 0.45–4.5)

## 2025-02-22 LAB — INHIBIN A SERPL-MCNC: 21.1 PG/ML

## 2025-02-23 NOTE — PROGRESS NOTES
GYN VISIT    Chief Complaint   Patient presents with    Follow-up     Here for hormone check        SUBJECTIVE    Sonali is a 49 y.o.  who presents today for an evaluation of her hormone levels. She reports that she has been experiencing hot flashes, night sweats, breast tenderness, and erratic menstrual cycles for approximately the last 6 months. Last month she experienced 2 weeks of painless bleeding followed by a week of normal bleeding/cramping. She does report a history of migraine headaches but does not have an aura with those. She was diagnosed with a B12 deficiency around 1 year ago which improved with sublingual B12 supplementation.     LMP: Patient's last menstrual period was 2025.    Past Medical History:   Diagnosis Date    Abnormal Pap smear of cervix     Allergic     Allergic rhinitis     Asthma 2020    Covid related    COVID-19 virus infection 2020    Fibroid     Headache 2021    History of medical problems 2021    Uterine fiberoid- D&C    PMS (premenstrual syndrome)     Scoliosis         Past Surgical History:   Procedure Laterality Date    COLONOSCOPY N/A 2022    Procedure: COLONOSCOPY into cecum;  Surgeon: Geo Mendoza MD;  Location: [facility] ENDOSCOPY;  Service: Gastroenterology;  Laterality: N/A;  pre: colitis   post: normal    D & C WITH SUCTION  2021    DILATATION AND CURETTAGE  2007    WISDOM TOOTH EXTRACTION          Review of Systems   Constitutional:  Negative for chills, diaphoresis, fatigue and fever.   Genitourinary:  Positive for breast pain and menstrual problem. Negative for decreased urine volume, difficulty urinating, dyspareunia, flank pain, frequency, genital sores, hematuria, urgency, urinary incontinence, vaginal bleeding, vaginal discharge and vaginal pain.   Hematological:  Negative for adenopathy.   All other systems reviewed and are negative.      OBJECTIVE     Vitals:    25 1453   BP: 119/81   Weight: 62.1 kg (137 lb)  "  Height: 177.8 cm (70\")        Physical Exam  Constitutional:       General: She is awake.      Appearance: Normal appearance. She is well-developed and well-groomed.   HENT:      Head: Normocephalic and atraumatic.   Pulmonary:      Effort: Pulmonary effort is normal.   Musculoskeletal:      Cervical back: Normal range of motion.   Neurological:      General: No focal deficit present.      Mental Status: She is alert and oriented to person, place, and time.   Skin:     General: Skin is warm and dry.   Psychiatric:         Mood and Affect: Mood normal.         Behavior: Behavior normal. Behavior is cooperative.   Vitals reviewed.         ASSESSMENT/PLAN    Diagnoses and all orders for this visit:    1. Hot flashes (Primary)  -     Estradiol  -     FSH & LH  -     Testosterone  -     Inhibin A, Ultrasensitive  -     Follicle Stimulating Hormone  -     Hemoglobin & Hematocrit, Blood  -     TSH  -     T4  -     T3, Uptake  -     T3 Uptake & FTI  -     Progesterone    2. Night sweats  -     Estradiol  -     FSH & LH  -     Testosterone  -     Inhibin A, Ultrasensitive  -     Follicle Stimulating Hormone  -     Hemoglobin & Hematocrit, Blood  -     TSH  -     T4  -     T3, Uptake  -     T3 Uptake & FTI  -     Progesterone    Discussed drawing hormone levels today to establish a baseline to check against at a later time to confirm changes.   Consideration for treating perimenopausal changes.  She will return to care for her annual exam.     Return for annual exam or as needed before.    I spent 30 minutes caring for Sonali on this date of service. This time includes time spent by me in the following activities: preparing for the visit, reviewing tests, performing a medically appropriate examination and/or evaluation, counseling and educating the patient/family/caregiver, referring and communicating with other health care professionals, documenting information in the medical record, independently interpreting results and " communicating that information with the patient/family/caregiver, care coordination, ordering medications, ordering test(s), ordering procedure(s), obtaining a separately obtained history, and reviewing a separately obtained history.    Cass Vargas CNM  2/23/2025  10:31 EST

## 2025-03-31 ENCOUNTER — OFFICE VISIT (OUTPATIENT)
Dept: OBSTETRICS AND GYNECOLOGY | Facility: CLINIC | Age: 50
End: 2025-03-31
Payer: COMMERCIAL

## 2025-03-31 VITALS
BODY MASS INDEX: 19.61 KG/M2 | DIASTOLIC BLOOD PRESSURE: 60 MMHG | WEIGHT: 137 LBS | HEIGHT: 70 IN | SYSTOLIC BLOOD PRESSURE: 100 MMHG

## 2025-03-31 DIAGNOSIS — R23.2 HOT FLASHES: Primary | ICD-10-CM

## 2025-03-31 DIAGNOSIS — N95.1 MENOPAUSAL SYMPTOMS: ICD-10-CM

## 2025-03-31 DIAGNOSIS — R61 NIGHT SWEATS: ICD-10-CM

## 2025-03-31 NOTE — PROGRESS NOTES
"GYN VISIT    Chief Complaint   Patient presents with    Follow-up     Here today to discuss hormones        SUBJECTIVE    Sonali is a 49 y.o.  who presents with for follow-up visit for menopausal symptoms. She reports that she has been having hot flashes occasionally but feels that the fatigue and lack of being able to maintain muscle mass should be treated. She would also like to discuss her very irregular menses today.     LMP: Patient's last menstrual period was 2025.    Past Medical History:   Diagnosis Date    Abnormal Pap smear of cervix     Allergic     Allergic rhinitis     Asthma 2020    Covid related    COVID-19 virus infection 2020    Fibroid     Headache 2021    History of medical problems 2021    Uterine fiberoid- D&C    PMS (premenstrual syndrome)     Scoliosis         Past Surgical History:   Procedure Laterality Date    COLONOSCOPY N/A 2022    Procedure: COLONOSCOPY into cecum;  Surgeon: Geo Mendoza MD;  Location: [facility] ENDOSCOPY;  Service: Gastroenterology;  Laterality: N/A;  pre: colitis   post: normal    D & C WITH SUCTION  2021    DILATATION AND CURETTAGE      WISDOM TOOTH EXTRACTION          Review of Systems    OBJECTIVE     Vitals:    25 1429   BP: 100/60   Weight: 62.1 kg (137 lb)   Height: 177.8 cm (70\")        OBGyn Exam    ASSESSMENT/PLAN    Diagnoses and all orders for this visit:    1. Hot flashes (Primary)  -     POC Urinalysis Dipstick  -     Testosterone Cypionate 2 %; Apply a pea sized amount of cream to the inner thigh daily.  Dispense: 30 g; Refill: 5    2. Night sweats  -     POC Urinalysis Dipstick  -     Testosterone Cypionate 2 %; Apply a pea sized amount of cream to the inner thigh daily.  Dispense: 30 g; Refill: 5    3. Menopausal symptoms  -     POC Urinalysis Dipstick  -     Testosterone Cypionate 2 %; Apply a pea sized amount of cream to the inner thigh daily.  Dispense: 30 g; Refill: 5    Education provided " on use of testosterone cream. This will be sent to a compounding pharmacy  Discussed options for controlling menses such as ablation.  If she would like to have an ablation she will return to care to have a preoperative consultation with 1 doctors.  We will adjust her hormone replacement based on her symptoms.  She will follow-up in 3 months for a video visit to discuss her satisfaction with the testosterone cream.    Return in about 3 months (around 6/30/2025) for video visit.    I spent 30 minutes caring for Sonali on this date of service. This time includes time spent by me in the following activities: preparing for the visit, reviewing tests, performing a medically appropriate examination and/or evaluation, counseling and educating the patient/family/caregiver, referring and communicating with other health care professionals, documenting information in the medical record, independently interpreting results and communicating that information with the patient/family/caregiver, care coordination, ordering medications, ordering test(s), ordering procedure(s), obtaining a separately obtained history, and reviewing a separately obtained history.    Cass Vargas CNM  3/31/2025  18:36 EDT

## 2025-05-06 RX ORDER — ALBUTEROL SULFATE 90 UG/1
2 INHALANT RESPIRATORY (INHALATION) EVERY 4 HOURS PRN
Qty: 8.5 G | Refills: 2 | Status: SHIPPED | OUTPATIENT
Start: 2025-05-06

## 2025-05-17 ENCOUNTER — PATIENT MESSAGE (OUTPATIENT)
Dept: GASTROENTEROLOGY | Facility: CLINIC | Age: 50
End: 2025-05-17
Payer: COMMERCIAL

## 2025-05-17 DIAGNOSIS — R19.7 DIARRHEA, UNSPECIFIED TYPE: ICD-10-CM

## 2025-05-17 DIAGNOSIS — R10.11 RIGHT UPPER QUADRANT ABDOMINAL PAIN: Primary | ICD-10-CM

## 2025-07-14 ENCOUNTER — TELEMEDICINE (OUTPATIENT)
Dept: OBSTETRICS AND GYNECOLOGY | Facility: CLINIC | Age: 50
End: 2025-07-14
Payer: COMMERCIAL

## 2025-07-14 DIAGNOSIS — N95.1 MENOPAUSAL SYMPTOMS: Primary | ICD-10-CM

## 2025-07-14 NOTE — PROGRESS NOTES
GYN VISIT    Chief Complaint   Patient presents with    Med Refill     Reviewed and discussed hormone replacement therapy.        SUBJECTIVE    Sonali is a 50 y.o.  who presents today via telehealth to discuss her hormone replacement therapy.  She reports that while she does feel improved on her testosterone her periods have become painful and unmanageable.  She would like to check her estrogen levels.   This was an audio and video enabled telemedicine encounter. Sonali was located at home and I was located at [facility] office for this telemedicine/telephone encounter.   We utilized TAKO for the encounter and Sonali and I were able to hear [and see] each other simultaneously in real time.   I introduced myself and verified Sonali's identity.   I explained how the telemedicine visit will occur.   I advised Sonali that technology-related delays and breaches of privacy are potential risks associated with conducting the encounter via telemedicine.         LMP: No LMP recorded.    Past Medical History:   Diagnosis Date    Abnormal Pap smear of cervix     Allergic     Allergic rhinitis     Asthma 2020    Covid related    COVID-19 virus infection 2020    Fibroid     Headache 2021    History of medical problems 2021    Uterine fiberoid- D&C    PMS (premenstrual syndrome)     Scoliosis         Past Surgical History:   Procedure Laterality Date    COLONOSCOPY N/A 2022    Procedure: COLONOSCOPY into cecum;  Surgeon: Geo Mendoza MD;  Location: [facility] ENDOSCOPY;  Service: Gastroenterology;  Laterality: N/A;  pre: colitis   post: normal    D & C WITH SUCTION  2021    DILATATION AND CURETTAGE  2007    WISDOM TOOTH EXTRACTION          Review of Systems   Constitutional:  Negative for chills, diaphoresis, fatigue and fever.   Genitourinary:  Positive for menstrual problem. Negative for decreased urine volume, difficulty urinating, dyspareunia, flank pain, frequency,  genital sores, hematuria, urgency, urinary incontinence, vaginal bleeding, vaginal discharge and vaginal pain.   Hematological:  Negative for adenopathy.   All other systems reviewed and are negative.      OBJECTIVE     There were no vitals filed for this visit.     Physical Exam  Constitutional:       General: She is awake.      Appearance: Normal appearance. She is well-developed and well-groomed.   HENT:      Head: Normocephalic and atraumatic.   Pulmonary:      Effort: Pulmonary effort is normal.   Musculoskeletal:      Cervical back: Normal range of motion.   Neurological:      General: No focal deficit present.      Mental Status: She is alert and oriented to person, place, and time.   Skin:     General: Skin is warm and dry.   Psychiatric:         Mood and Affect: Mood normal.         Behavior: Behavior normal. Behavior is cooperative.   Vitals reviewed.         ASSESSMENT/PLAN    Diagnoses and all orders for this visit:    1. Menopausal symptoms (Primary)  -     Testosterone Cypionate 2 %; Apply 1 Application topically to the appropriate area as directed Daily. Use 5 mg of cream to the upper thigh daily, making sure to rotate sites as discussed.  Dispense: 39 g; Refill: 5  -     Estradiol; Future  -     Estrogens, Total; Future    Orders entered for estrogen and estradiol levels.  Will notify patient of results and treat if indicated.  Prescription sent for testosterone replacement.  She will return to care to discuss surgical options for abnormal uterine bleeding in the perimenopausal period.  She will see Dr. Morris for this.    Return for preoperative consultation with Dr. Morris.    I spent 30 minutes caring for Sonali on this date of service. This time includes time spent by me in the following activities: preparing for the visit, reviewing tests, performing a medically appropriate examination and/or evaluation, counseling and educating the patient/family/caregiver, referring and communicating with other  health care professionals, documenting information in the medical record, independently interpreting results and communicating that information with the patient/family/caregiver, care coordination, ordering medications, ordering test(s), ordering procedure(s), obtaining a separately obtained history, and reviewing a separately obtained history.    Cass Vargas CNM  7/14/2025  12:17 EDT        .tele

## 2025-07-22 ENCOUNTER — PATIENT MESSAGE (OUTPATIENT)
Dept: OBSTETRICS AND GYNECOLOGY | Facility: CLINIC | Age: 50
End: 2025-07-22
Payer: COMMERCIAL

## 2025-07-22 DIAGNOSIS — N95.1 MENOPAUSAL SYMPTOMS: Primary | ICD-10-CM

## 2025-07-28 ENCOUNTER — LAB (OUTPATIENT)
Dept: OBSTETRICS AND GYNECOLOGY | Facility: CLINIC | Age: 50
End: 2025-07-28
Payer: COMMERCIAL

## 2025-07-28 DIAGNOSIS — N95.1 MENOPAUSAL SYMPTOMS: ICD-10-CM

## 2025-07-30 LAB
ESTRADIOL SERPL-MCNC: 302 PG/ML
ESTROGEN SERPL-MCNC: 572 PG/ML
FSH SERPL-ACNC: 7.8 MIU/ML
LH SERPL-ACNC: 16.6 MIU/ML
T4 FREE SERPL-MCNC: 0.89 NG/DL (ref 0.82–1.77)
TESTOST SERPL-MCNC: 9 NG/DL (ref 4–50)
TSH SERPL DL<=0.005 MIU/L-ACNC: 1.41 UIU/ML (ref 0.45–4.5)

## 2025-08-06 ENCOUNTER — OFFICE VISIT (OUTPATIENT)
Dept: OBSTETRICS AND GYNECOLOGY | Facility: CLINIC | Age: 50
End: 2025-08-06
Payer: COMMERCIAL

## 2025-08-06 VITALS
HEIGHT: 70 IN | WEIGHT: 139.4 LBS | HEART RATE: 71 BPM | DIASTOLIC BLOOD PRESSURE: 71 MMHG | BODY MASS INDEX: 19.96 KG/M2 | SYSTOLIC BLOOD PRESSURE: 104 MMHG

## 2025-08-06 DIAGNOSIS — R10.2 PELVIC PAIN: Primary | ICD-10-CM

## 2025-08-06 DIAGNOSIS — N92.6 IRREGULAR MENSES: ICD-10-CM

## 2025-08-06 PROCEDURE — 99213 OFFICE O/P EST LOW 20 MIN: CPT | Performed by: OBSTETRICS & GYNECOLOGY

## 2025-08-06 RX ORDER — OLOPATADINE HYDROCHLORIDE AND MOMETASONE FUROATE 25; 665 UG/1; UG/1
SPRAY, METERED NASAL
COMMUNITY
Start: 2025-07-05

## (undated) DEVICE — SENSR O2 OXIMAX FNGR A/ 18IN NONSTR

## (undated) DEVICE — TUBING, SUCTION, 1/4" X 10', STRAIGHT: Brand: MEDLINE

## (undated) DEVICE — ADAPT CLN BIOGUARD AIR/H2O DISP

## (undated) DEVICE — KT ORCA ORCAPOD DISP STRL

## (undated) DEVICE — CANN O2 ETCO2 FITS ALL CONN CO2 SMPL A/ 7IN DISP LF

## (undated) DEVICE — LN SMPL CO2 SHTRM SD STREAM W/M LUER